# Patient Record
Sex: MALE | Race: BLACK OR AFRICAN AMERICAN | NOT HISPANIC OR LATINO | Employment: UNEMPLOYED | URBAN - METROPOLITAN AREA
[De-identification: names, ages, dates, MRNs, and addresses within clinical notes are randomized per-mention and may not be internally consistent; named-entity substitution may affect disease eponyms.]

---

## 2023-02-10 PROBLEM — T85.22XA DISLOCATED INTRAOCULAR LENS: Status: ACTIVE | Noted: 2023-02-10

## 2023-02-10 PROBLEM — I83.10 VARICOSE VEINS WITH INFLAMMATION: Status: ACTIVE | Noted: 2023-02-10

## 2023-02-10 PROBLEM — N18.6 ANEMIA DUE TO CHRONIC KIDNEY DISEASE, ON CHRONIC DIALYSIS (MULTI): Status: ACTIVE | Noted: 2023-02-10

## 2023-02-10 PROBLEM — D63.1 ANEMIA DUE TO CHRONIC KIDNEY DISEASE, ON CHRONIC DIALYSIS (MULTI): Status: ACTIVE | Noted: 2023-02-10

## 2023-02-10 PROBLEM — H52.209 ASTIGMATISM: Status: ACTIVE | Noted: 2023-02-10

## 2023-02-10 PROBLEM — Z96.1 PSEUDOPHAKIA OF LEFT EYE: Status: ACTIVE | Noted: 2023-02-10

## 2023-02-10 PROBLEM — M79.675 PAIN IN TOES OF BOTH FEET: Status: ACTIVE | Noted: 2023-02-10

## 2023-02-10 PROBLEM — B35.1 ONYCHOMYCOSIS: Status: ACTIVE | Noted: 2023-02-10

## 2023-02-10 PROBLEM — K42.9 UMBILICAL HERNIA: Status: ACTIVE | Noted: 2023-02-10

## 2023-02-10 PROBLEM — H35.351 CYSTOID MACULAR EDEMA OF RIGHT EYE: Status: ACTIVE | Noted: 2023-02-10

## 2023-02-10 PROBLEM — J45.909 ASTHMATIC BRONCHITIS (HHS-HCC): Status: ACTIVE | Noted: 2023-02-10

## 2023-02-10 PROBLEM — R73.03 PREDIABETES: Status: ACTIVE | Noted: 2023-02-10

## 2023-02-10 PROBLEM — M25.561 ARTHRALGIA OF BOTH KNEES: Status: ACTIVE | Noted: 2023-02-10

## 2023-02-10 PROBLEM — M54.50 LOW BACK PAIN: Status: ACTIVE | Noted: 2023-02-10

## 2023-02-10 PROBLEM — R91.1 PULMONARY NODULE SEEN ON IMAGING STUDY: Status: ACTIVE | Noted: 2023-02-10

## 2023-02-10 PROBLEM — G47.00 INSOMNIA: Status: ACTIVE | Noted: 2023-02-10

## 2023-02-10 PROBLEM — H52.00 HYPEROPIA WITH PRESBYOPIA: Status: ACTIVE | Noted: 2023-02-10

## 2023-02-10 PROBLEM — D36.9 TUBULAR ADENOMA: Status: ACTIVE | Noted: 2023-02-10

## 2023-02-10 PROBLEM — K57.32 DIVERTICULITIS OF COLON: Status: ACTIVE | Noted: 2023-02-10

## 2023-02-10 PROBLEM — Z96.1 PSEUDOPHAKIA OF RIGHT EYE: Status: ACTIVE | Noted: 2023-02-10

## 2023-02-10 PROBLEM — N62 GYNECOMASTIA: Status: ACTIVE | Noted: 2023-02-10

## 2023-02-10 PROBLEM — M17.0 PRIMARY OSTEOARTHRITIS OF BOTH KNEES: Status: ACTIVE | Noted: 2023-02-10

## 2023-02-10 PROBLEM — H52.4 HYPEROPIA WITH PRESBYOPIA: Status: ACTIVE | Noted: 2023-02-10

## 2023-02-10 PROBLEM — M25.559 HIP JOINT PAIN: Status: ACTIVE | Noted: 2023-02-10

## 2023-02-10 PROBLEM — F17.210 CIGARETTE SMOKER: Status: ACTIVE | Noted: 2023-02-10

## 2023-02-10 PROBLEM — E55.9 VITAMIN D DEFICIENCY: Status: ACTIVE | Noted: 2023-02-10

## 2023-02-10 PROBLEM — I10 BENIGN ESSENTIAL HTN: Status: ACTIVE | Noted: 2023-02-10

## 2023-02-10 PROBLEM — R63.5 WEIGHT GAIN: Status: ACTIVE | Noted: 2023-02-10

## 2023-02-10 PROBLEM — G62.9 PERIPHERAL NEUROPATHY: Status: ACTIVE | Noted: 2023-02-10

## 2023-02-10 PROBLEM — E11.9 DIABETES MELLITUS (MULTI): Status: ACTIVE | Noted: 2023-02-10

## 2023-02-10 PROBLEM — E66.01 MORBID OBESITY (MULTI): Status: ACTIVE | Noted: 2023-02-10

## 2023-02-10 PROBLEM — I48.91 ATRIAL FIBRILLATION (MULTI): Status: ACTIVE | Noted: 2023-02-10

## 2023-02-10 PROBLEM — Z99.2 ANEMIA DUE TO CHRONIC KIDNEY DISEASE, ON CHRONIC DIALYSIS (MULTI): Status: ACTIVE | Noted: 2023-02-10

## 2023-02-10 PROBLEM — M79.89 LEG SWELLING: Status: ACTIVE | Noted: 2023-02-10

## 2023-02-10 PROBLEM — M79.674 PAIN IN TOES OF BOTH FEET: Status: ACTIVE | Noted: 2023-02-10

## 2023-02-10 PROBLEM — M20.60 TOE DEFORMITY: Status: ACTIVE | Noted: 2023-02-10

## 2023-02-10 PROBLEM — H54.40 BLIND, UNILATERAL: Status: ACTIVE | Noted: 2023-02-10

## 2023-02-10 PROBLEM — E66.9 OBESITY, CLASS II, BMI 35-39.9: Status: ACTIVE | Noted: 2023-02-10

## 2023-02-10 PROBLEM — M79.672 LEFT FOOT PAIN: Status: ACTIVE | Noted: 2023-02-10

## 2023-02-10 PROBLEM — E66.812 CLASS 2 OBESITY WITH BODY MASS INDEX (BMI) OF 37.0 TO 37.9 IN ADULT: Status: ACTIVE | Noted: 2023-02-10

## 2023-02-10 PROBLEM — R10.9 ABDOMINAL DISCOMFORT: Status: ACTIVE | Noted: 2023-02-10

## 2023-02-10 PROBLEM — K59.00 CONSTIPATION: Status: ACTIVE | Noted: 2023-02-10

## 2023-02-10 PROBLEM — K43.9 VENTRAL HERNIA: Status: ACTIVE | Noted: 2023-02-10

## 2023-02-10 PROBLEM — F10.10 ALCOHOL ABUSE, CONTINUOUS: Status: ACTIVE | Noted: 2023-02-10

## 2023-02-10 PROBLEM — M25.562 ARTHRALGIA OF BOTH KNEES: Status: ACTIVE | Noted: 2023-02-10

## 2023-02-10 PROBLEM — J44.9 COPD, MODERATE (MULTI): Status: ACTIVE | Noted: 2023-02-10

## 2023-02-10 PROBLEM — E66.812 OBESITY, CLASS II, BMI 35-39.9: Status: ACTIVE | Noted: 2023-02-10

## 2023-02-10 PROBLEM — R60.0 EDEMA OF BOTH LEGS: Status: ACTIVE | Noted: 2023-02-10

## 2023-02-10 PROBLEM — I87.2 VENOUS STASIS DERMATITIS OF LOWER EXTREMITY: Status: ACTIVE | Noted: 2023-02-10

## 2023-02-10 PROBLEM — G47.33 OBSTRUCTIVE SLEEP APNEA OF ADULT: Status: ACTIVE | Noted: 2023-02-10

## 2023-02-10 PROBLEM — M25.569 KNEE PAIN: Status: ACTIVE | Noted: 2023-02-10

## 2023-02-10 PROBLEM — E66.9 CLASS 2 OBESITY WITH BODY MASS INDEX (BMI) OF 37.0 TO 37.9 IN ADULT: Status: ACTIVE | Noted: 2023-02-10

## 2023-02-10 PROBLEM — I83.10 STASIS ECZEMA: Status: ACTIVE | Noted: 2023-02-10

## 2023-02-10 RX ORDER — TRIAMTERENE/HYDROCHLOROTHIAZID 37.5-25 MG
1 TABLET ORAL DAILY
COMMUNITY
Start: 2022-03-02 | End: 2023-05-01 | Stop reason: SDUPTHER

## 2023-02-10 RX ORDER — ALBUTEROL SULFATE 90 UG/1
2 AEROSOL, METERED RESPIRATORY (INHALATION) EVERY 4 HOURS PRN
COMMUNITY
Start: 2017-04-10 | End: 2023-05-01 | Stop reason: SDUPTHER

## 2023-02-10 RX ORDER — AMMONIUM LACTATE 12 G/100G
LOTION TOPICAL 2 TIMES DAILY
COMMUNITY
Start: 2020-01-17 | End: 2023-03-17 | Stop reason: SDUPTHER

## 2023-02-10 RX ORDER — METFORMIN HYDROCHLORIDE EXTENDED-RELEASE TABLETS 500 MG/1
500 TABLET, FILM COATED, EXTENDED RELEASE ORAL
COMMUNITY
End: 2023-05-01 | Stop reason: SDUPTHER

## 2023-02-10 RX ORDER — LANCETS 33 GAUGE
EACH MISCELLANEOUS
COMMUNITY

## 2023-02-10 RX ORDER — ASPIRIN 81 MG/1
1 TABLET ORAL DAILY
COMMUNITY
Start: 2018-01-25

## 2023-02-10 RX ORDER — MELOXICAM 7.5 MG/1
1 TABLET ORAL DAILY
COMMUNITY
Start: 2020-12-02 | End: 2023-03-17 | Stop reason: SDUPTHER

## 2023-02-10 RX ORDER — CYCLOPENTOLATE HYDROCHLORIDE 10 MG/ML
1 SOLUTION/ DROPS OPHTHALMIC 2 TIMES DAILY
COMMUNITY
Start: 2020-06-09

## 2023-02-10 RX ORDER — TRIAMCINOLONE ACETONIDE 0.25 MG/G
OINTMENT TOPICAL
COMMUNITY
Start: 2019-03-21 | End: 2023-03-17 | Stop reason: SDUPTHER

## 2023-02-10 RX ORDER — BUDESONIDE 0.5 MG/2ML
0.5 INHALANT ORAL 2 TIMES DAILY
COMMUNITY
Start: 2020-08-04 | End: 2023-05-01 | Stop reason: SDUPTHER

## 2023-02-10 RX ORDER — TRIAMCINOLONE ACETONIDE 1 MG/ML
LOTION TOPICAL
COMMUNITY
Start: 2021-05-19

## 2023-02-10 RX ORDER — ACETAMINOPHEN 325 MG/1
650 TABLET ORAL EVERY 6 HOURS
COMMUNITY
Start: 2021-02-22

## 2023-02-10 RX ORDER — BLOOD SUGAR DIAGNOSTIC
STRIP MISCELLANEOUS
COMMUNITY
Start: 2021-05-25

## 2023-02-10 RX ORDER — AZELASTINE HYDROCHLORIDE 0.5 MG/ML
1 SOLUTION/ DROPS OPHTHALMIC 2 TIMES DAILY PRN
COMMUNITY
Start: 2021-09-28

## 2023-03-17 DIAGNOSIS — M25.569 ARTHRALGIA OF KNEE, UNSPECIFIED LATERALITY: Primary | ICD-10-CM

## 2023-03-17 DIAGNOSIS — R21 RASH: ICD-10-CM

## 2023-03-17 RX ORDER — TRIAMCINOLONE ACETONIDE 0.25 MG/G
OINTMENT TOPICAL 2 TIMES DAILY
Qty: 400 G | Refills: 1 | Status: SHIPPED | OUTPATIENT
Start: 2023-03-17 | End: 2024-05-21 | Stop reason: SDUPTHER

## 2023-03-17 RX ORDER — MELOXICAM 7.5 MG/1
7.5 TABLET ORAL DAILY
Qty: 90 TABLET | Refills: 1 | Status: SHIPPED | OUTPATIENT
Start: 2023-03-17 | End: 2023-03-24 | Stop reason: SDUPTHER

## 2023-03-17 RX ORDER — AMMONIUM LACTATE 12 G/100G
LOTION TOPICAL AS NEEDED
Qty: 400 G | Refills: 1 | Status: SHIPPED | OUTPATIENT
Start: 2023-03-17 | End: 2023-03-24 | Stop reason: SDUPTHER

## 2023-03-20 DIAGNOSIS — R21 RASH: Primary | ICD-10-CM

## 2023-03-20 DIAGNOSIS — M25.569 ARTHRALGIA OF KNEE, UNSPECIFIED LATERALITY: ICD-10-CM

## 2023-03-21 RX ORDER — TRIAMCINOLONE ACETONIDE 1 MG/G
CREAM TOPICAL 2 TIMES DAILY
Qty: 15 G | Refills: 0 | Status: SHIPPED | OUTPATIENT
Start: 2023-03-21

## 2023-03-21 RX ORDER — AMMONIUM LACTATE 12 G/100G
LOTION TOPICAL ONCE
Status: SHIPPED | OUTPATIENT
Start: 2023-03-21

## 2023-03-21 RX ORDER — MELOXICAM 7.5 MG/1
7.5 TABLET ORAL ONCE
Status: SHIPPED | OUTPATIENT
Start: 2023-03-21

## 2023-03-23 DIAGNOSIS — M25.569 ARTHRALGIA OF KNEE, UNSPECIFIED LATERALITY: ICD-10-CM

## 2023-03-23 DIAGNOSIS — R21 RASH: ICD-10-CM

## 2023-03-24 RX ORDER — AMMONIUM LACTATE 12 G/100G
LOTION TOPICAL AS NEEDED
Qty: 400 G | Refills: 1 | Status: SHIPPED | OUTPATIENT
Start: 2023-03-24 | End: 2023-05-01 | Stop reason: SDUPTHER

## 2023-03-24 RX ORDER — MELOXICAM 7.5 MG/1
7.5 TABLET ORAL DAILY
Qty: 90 TABLET | Refills: 1 | Status: SHIPPED | OUTPATIENT
Start: 2023-03-24 | End: 2024-05-21 | Stop reason: SDUPTHER

## 2023-05-01 ENCOUNTER — OFFICE VISIT (OUTPATIENT)
Dept: PRIMARY CARE | Facility: CLINIC | Age: 68
End: 2023-05-01
Payer: MEDICARE

## 2023-05-01 VITALS
BODY MASS INDEX: 40.62 KG/M2 | TEMPERATURE: 97.4 F | HEIGHT: 68 IN | WEIGHT: 268 LBS | SYSTOLIC BLOOD PRESSURE: 120 MMHG | HEART RATE: 59 BPM | DIASTOLIC BLOOD PRESSURE: 69 MMHG

## 2023-05-01 DIAGNOSIS — R21 RASH: ICD-10-CM

## 2023-05-01 DIAGNOSIS — Z00.00 HEALTHCARE MAINTENANCE: ICD-10-CM

## 2023-05-01 DIAGNOSIS — E55.9 VITAMIN D DEFICIENCY: ICD-10-CM

## 2023-05-01 DIAGNOSIS — E11.9 TYPE 2 DIABETES MELLITUS WITHOUT COMPLICATION, WITHOUT LONG-TERM CURRENT USE OF INSULIN (MULTI): ICD-10-CM

## 2023-05-01 DIAGNOSIS — J42 CHRONIC BRONCHITIS, UNSPECIFIED CHRONIC BRONCHITIS TYPE (MULTI): Primary | ICD-10-CM

## 2023-05-01 DIAGNOSIS — J42 CHRONIC BRONCHITIS, UNSPECIFIED CHRONIC BRONCHITIS TYPE (MULTI): ICD-10-CM

## 2023-05-01 DIAGNOSIS — I10 HYPERTENSION, UNSPECIFIED TYPE: ICD-10-CM

## 2023-05-01 DIAGNOSIS — E11.9 TYPE 2 DIABETES MELLITUS WITHOUT COMPLICATION, WITHOUT LONG-TERM CURRENT USE OF INSULIN (MULTI): Primary | ICD-10-CM

## 2023-05-01 PROCEDURE — G0438 PPPS, INITIAL VISIT: HCPCS | Performed by: FAMILY MEDICINE

## 2023-05-01 PROCEDURE — 3078F DIAST BP <80 MM HG: CPT | Performed by: FAMILY MEDICINE

## 2023-05-01 PROCEDURE — 1159F MED LIST DOCD IN RCRD: CPT | Performed by: FAMILY MEDICINE

## 2023-05-01 PROCEDURE — 3074F SYST BP LT 130 MM HG: CPT | Performed by: FAMILY MEDICINE

## 2023-05-01 RX ORDER — TRIAMTERENE/HYDROCHLOROTHIAZID 37.5-25 MG
1 TABLET ORAL DAILY
Qty: 90 TABLET | Refills: 1 | Status: SHIPPED | OUTPATIENT
Start: 2023-05-01 | End: 2024-05-21 | Stop reason: SDUPTHER

## 2023-05-01 RX ORDER — METFORMIN HYDROCHLORIDE EXTENDED-RELEASE TABLETS 500 MG/1
500 TABLET, FILM COATED, EXTENDED RELEASE ORAL
Qty: 90 TABLET | Refills: 3 | Status: SHIPPED | OUTPATIENT
Start: 2023-05-01 | End: 2023-05-03 | Stop reason: ENTERED-IN-ERROR

## 2023-05-01 RX ORDER — ALBUTEROL SULFATE 5 MG/ML
SOLUTION RESPIRATORY (INHALATION)
Qty: 1 ML | Refills: 3 | Status: SHIPPED | OUTPATIENT
Start: 2023-05-01 | End: 2024-04-30

## 2023-05-01 RX ORDER — ALBUTEROL SULFATE 90 UG/1
2 AEROSOL, METERED RESPIRATORY (INHALATION) EVERY 4 HOURS PRN
Qty: 18 G | Refills: 11 | Status: SHIPPED | OUTPATIENT
Start: 2023-05-01 | End: 2024-05-21 | Stop reason: SDUPTHER

## 2023-05-01 RX ORDER — METFORMIN HYDROCHLORIDE EXTENDED-RELEASE TABLETS 500 MG/1
500 TABLET, FILM COATED, EXTENDED RELEASE ORAL
Qty: 90 TABLET | Refills: 1 | Status: SHIPPED | OUTPATIENT
Start: 2023-05-01 | End: 2023-11-28

## 2023-05-01 RX ORDER — BUDESONIDE 0.5 MG/2ML
0.5 INHALANT ORAL
Qty: 60 ML | Refills: 3 | Status: SHIPPED | OUTPATIENT
Start: 2023-05-01 | End: 2023-11-28 | Stop reason: WASHOUT

## 2023-05-01 RX ORDER — AMMONIUM LACTATE 12 G/100G
LOTION TOPICAL AS NEEDED
Qty: 400 G | Refills: 1 | Status: SHIPPED | OUTPATIENT
Start: 2023-05-01 | End: 2023-12-05 | Stop reason: SDUPTHER

## 2023-05-01 ASSESSMENT — ENCOUNTER SYMPTOMS
LOSS OF SENSATION IN FEET: 0
OCCASIONAL FEELINGS OF UNSTEADINESS: 0
DEPRESSION: 0

## 2023-05-01 NOTE — PROGRESS NOTES
"Subjective   Patient ID: Alysia Hart is a 67 y.o. male who presents for [facility].    HPI     Review of Systems    Objective   /69   Pulse 59   Temp 36.3 °C (97.4 °F)   Ht 1.727 m (5' 8\")   Wt 122 kg (268 lb)   BMI 40.75 kg/m²     Physical Exam  HENT:      Head: Normocephalic.      Right Ear: Tympanic membrane normal.   Cardiovascular:      Rate and Rhythm: Normal rate.   Pulmonary:      Effort: Pulmonary effort is normal.      Breath sounds: Normal breath sounds.   Musculoskeletal:         General: Normal range of motion.      Cervical back: Normal range of motion.   Skin:     General: Skin is warm.   Neurological:      General: No focal deficit present.      Mental Status: He is alert.         Assessment/Plan     This is a 67-year-old male patient here for his Medicare well visit    He has not been wearing his CPAP machine I indicated that it is very important for him to wear the CPAP machine    I also reeducated about the COPD    Renewed his prescription for Pulmicort and albuterol for the nebulizer and also albuterol inhaler    He has a protuberant abdomen and ventral hernia advised him to continue to walk and exercise    Also suggested Silver sneakers    He has stasis eczema encouraged him to wear the support stocking and also use Lac-Hydrin    Routine labs will be drawn    Colonoscopy due next year    Advised him to come back in 6 months    He has stopped smoking several         "

## 2023-05-01 NOTE — PATIENT INSTRUCTIONS
Please wear your CPAP machine    Sleep apnea can cause death in your sleep or heart attack or a stroke    You have to be on the maintenance inhaler every day so that you will not collect mucus in your lung    For the COPD I have ordered the medicine to put in your aerosol machine that is the Pulmicort and albuterol 1 unit in the morning and lasting at night and in between you can use your albuterol handheld puffer 2 puffs for shortness of breath okay        Colonoscopy due next year      CAT scan of your lung was good      1.  Every day sleep  at night or rest ( some days we are unable to sleep )around the same time without the TV-this is important habit to reduce dementia and aging prematurely    2.  Eat 3 cups of green leafy vegetables daily include at least 1 fruit.,  Reduce animal protein consumption-/ also  Starch  consumption  --this will help to lose weight avoid illnesses such as dementia high blood pressure cancer.,  Diabetes    3.  Exercise including aerobics as well as resistant exercise for at least 45 minutes a day for 5 days this will also help to reduce premature aging       Please asked [facility] to send us the information of your COVID vaccines and your shot for shingles      Look into Silver sneakers program on your Medicare insurance plan it is a free exercise program

## 2023-05-01 NOTE — PROGRESS NOTES
Subjective   Reason for Visit: Alysia Hart is an 67 y.o. male here for a Medicare Wellness visit.               HPI    Patient Care Team:  Nuris Long MD as PCP - General  Nuris Long MD as PCP - [employer] PCP     Review of Systems    Objective   Vitals:  There were no vitals taken for this visit.      Physical Exam    Assessment/Plan   Problem List Items Addressed This Visit    None

## 2023-05-03 RX ORDER — METFORMIN HYDROCHLORIDE 500 MG/1
500 TABLET ORAL
Qty: 60 TABLET | Refills: 11 | Status: SHIPPED | OUTPATIENT
Start: 2023-05-03 | End: 2023-11-28

## 2023-08-22 PROBLEM — H26.9 CATARACT: Status: ACTIVE | Noted: 2023-08-22

## 2023-08-22 PROBLEM — H26.491 PCO (POSTERIOR CAPSULAR OPACIFICATION), RIGHT: Status: ACTIVE | Noted: 2023-08-22

## 2023-08-22 PROBLEM — H25.12 CATARACT, NUCLEAR SCLEROTIC, LEFT EYE: Status: ACTIVE | Noted: 2023-08-22

## 2023-08-22 PROBLEM — R10.9 ABDOMINAL PAIN: Status: ACTIVE | Noted: 2023-08-22

## 2023-08-22 PROBLEM — H25.812 COMBINED FORM OF AGE-RELATED CATARACT, LEFT EYE: Status: ACTIVE | Noted: 2023-08-22

## 2023-08-22 PROBLEM — G60.3 IDIOPATHIC PROGRESSIVE POLYNEUROPATHY: Status: ACTIVE | Noted: 2020-10-12

## 2023-08-22 PROBLEM — R20.2 PARESTHESIA OF FOOT: Status: ACTIVE | Noted: 2023-08-22

## 2023-08-22 PROBLEM — D64.9 ANEMIA: Status: ACTIVE | Noted: 2023-08-22

## 2023-08-22 PROBLEM — F32.A ANXIETY AND DEPRESSION: Status: ACTIVE | Noted: 2023-08-22

## 2023-08-22 PROBLEM — R06.02 SOB (SHORTNESS OF BREATH) ON EXERTION: Status: ACTIVE | Noted: 2023-08-22

## 2023-08-22 PROBLEM — T14.8XXA WOUND, OPEN: Status: ACTIVE | Noted: 2023-08-22

## 2023-08-22 PROBLEM — H53.8 BLURRED VISION: Status: ACTIVE | Noted: 2023-08-22

## 2023-08-22 PROBLEM — H04.123 BILATERAL DRY EYES: Status: ACTIVE | Noted: 2023-08-22

## 2023-08-22 PROBLEM — I87.2 VENOUS INSUFFICIENCY: Status: ACTIVE | Noted: 2023-08-22

## 2023-08-22 PROBLEM — L97.219: Status: ACTIVE | Noted: 2023-08-22

## 2023-08-22 PROBLEM — F41.9 ANXIETY AND DEPRESSION: Status: ACTIVE | Noted: 2023-08-22

## 2023-08-22 PROBLEM — H25.89 CATARACT MATURE, TOTAL SENILE: Status: ACTIVE | Noted: 2023-08-22

## 2023-08-22 RX ORDER — CHOLECALCIFEROL (VITAMIN D3) 25 MCG
1 TABLET ORAL DAILY
COMMUNITY

## 2023-08-22 RX ORDER — IPRATROPIUM BROMIDE AND ALBUTEROL SULFATE 2.5; .5 MG/3ML; MG/3ML
3 SOLUTION RESPIRATORY (INHALATION) EVERY 4 HOURS
COMMUNITY
Start: 2020-06-05 | End: 2023-11-28 | Stop reason: WASHOUT

## 2023-08-22 RX ORDER — OMEPRAZOLE 20 MG/1
20 CAPSULE, DELAYED RELEASE ORAL
COMMUNITY
Start: 2017-06-15 | End: 2023-11-28 | Stop reason: WASHOUT

## 2023-08-22 RX ORDER — MIRTAZAPINE 15 MG/1
15 TABLET, FILM COATED ORAL NIGHTLY
COMMUNITY
Start: 2022-08-03 | End: 2023-11-28 | Stop reason: WASHOUT

## 2023-08-22 RX ORDER — HYDROGEN PEROXIDE 3 %
20 SOLUTION, NON-ORAL MISCELLANEOUS
COMMUNITY
Start: 2010-06-21 | End: 2023-11-28 | Stop reason: WASHOUT

## 2023-08-22 RX ORDER — MOMETASONE FUROATE 220 UG/1
2 INHALANT RESPIRATORY (INHALATION) DAILY
COMMUNITY
Start: 2017-06-13 | End: 2023-11-28 | Stop reason: WASHOUT

## 2023-08-22 RX ORDER — MONTELUKAST SODIUM 10 MG/1
10 TABLET ORAL NIGHTLY
COMMUNITY
Start: 2020-06-05 | End: 2023-11-28 | Stop reason: WASHOUT

## 2023-08-22 RX ORDER — NAPROXEN 500 MG/1
500 TABLET ORAL 2 TIMES DAILY
COMMUNITY
Start: 2015-11-09 | End: 2023-11-28 | Stop reason: WASHOUT

## 2023-08-22 RX ORDER — CLOTRIMAZOLE 1 %
CREAM (GRAM) TOPICAL 2 TIMES DAILY
COMMUNITY
Start: 2012-04-05

## 2023-08-22 RX ORDER — ACETAMINOPHEN 500 MG
2000 TABLET ORAL
COMMUNITY
Start: 2012-04-05

## 2023-08-22 RX ORDER — BUPROPION HYDROCHLORIDE 150 MG/1
TABLET, EXTENDED RELEASE ORAL
COMMUNITY
Start: 2015-12-11 | End: 2023-11-28 | Stop reason: WASHOUT

## 2023-08-22 RX ORDER — BUDESONIDE AND FORMOTEROL FUMARATE DIHYDRATE 160; 4.5 UG/1; UG/1
2 AEROSOL RESPIRATORY (INHALATION) 2 TIMES DAILY PRN
COMMUNITY
End: 2023-11-28 | Stop reason: WASHOUT

## 2023-08-22 RX ORDER — ERGOCALCIFEROL 1.25 MG/1
50000 CAPSULE ORAL
COMMUNITY
Start: 2015-11-15

## 2023-08-22 RX ORDER — PANTOPRAZOLE SODIUM 20 MG/1
20 TABLET, DELAYED RELEASE ORAL DAILY
COMMUNITY
Start: 2018-01-26 | End: 2023-11-28 | Stop reason: WASHOUT

## 2023-08-22 RX ORDER — CELECOXIB 200 MG/1
1 CAPSULE ORAL 2 TIMES DAILY
COMMUNITY
Start: 2022-04-18 | End: 2023-11-28 | Stop reason: WASHOUT

## 2023-10-03 ENCOUNTER — APPOINTMENT (OUTPATIENT)
Dept: SLEEP MEDICINE | Facility: HOSPITAL | Age: 68
End: 2023-10-03
Payer: MEDICARE

## 2023-11-01 ENCOUNTER — APPOINTMENT (OUTPATIENT)
Dept: PRIMARY CARE | Facility: CLINIC | Age: 68
End: 2023-11-01
Payer: MEDICARE

## 2023-11-28 ENCOUNTER — OFFICE VISIT (OUTPATIENT)
Dept: PRIMARY CARE | Facility: CLINIC | Age: 68
End: 2023-11-28
Payer: MEDICARE

## 2023-11-28 VITALS
HEART RATE: 63 BPM | SYSTOLIC BLOOD PRESSURE: 128 MMHG | BODY MASS INDEX: 39.56 KG/M2 | DIASTOLIC BLOOD PRESSURE: 72 MMHG | TEMPERATURE: 97.5 F | HEIGHT: 68 IN | OXYGEN SATURATION: 96 % | WEIGHT: 261 LBS

## 2023-11-28 DIAGNOSIS — I10 BENIGN ESSENTIAL HTN: ICD-10-CM

## 2023-11-28 DIAGNOSIS — E11.9 TYPE 2 DIABETES MELLITUS WITHOUT COMPLICATION, WITHOUT LONG-TERM CURRENT USE OF INSULIN (MULTI): ICD-10-CM

## 2023-11-28 DIAGNOSIS — N52.9 IMPOTENCE: Primary | ICD-10-CM

## 2023-11-28 PROCEDURE — 1159F MED LIST DOCD IN RCRD: CPT | Performed by: FAMILY MEDICINE

## 2023-11-28 PROCEDURE — 99213 OFFICE O/P EST LOW 20 MIN: CPT | Performed by: FAMILY MEDICINE

## 2023-11-28 PROCEDURE — 3078F DIAST BP <80 MM HG: CPT | Performed by: FAMILY MEDICINE

## 2023-11-28 PROCEDURE — 1126F AMNT PAIN NOTED NONE PRSNT: CPT | Performed by: FAMILY MEDICINE

## 2023-11-28 PROCEDURE — 3074F SYST BP LT 130 MM HG: CPT | Performed by: FAMILY MEDICINE

## 2023-11-28 PROCEDURE — 1160F RVW MEDS BY RX/DR IN RCRD: CPT | Performed by: FAMILY MEDICINE

## 2023-11-28 RX ORDER — METFORMIN HYDROCHLORIDE 500 MG/1
1000 TABLET ORAL
Qty: 120 TABLET | Refills: 11 | Status: SHIPPED | OUTPATIENT
Start: 2023-11-28 | End: 2024-05-21 | Stop reason: SDUPTHER

## 2023-11-28 RX ORDER — SILDENAFIL 100 MG/1
100 TABLET, FILM COATED ORAL DAILY PRN
Qty: 12 TABLET | Refills: 3 | Status: SHIPPED | OUTPATIENT
Start: 2023-11-28 | End: 2024-11-27

## 2023-11-28 NOTE — PROGRESS NOTES
This is a 68-year-old male patient who is here for follow-up    He had a request Viagra    Regarding sleep apnea she has given away the machine I advised him to call the company to get back the machine he has moderate sleep apnea    He is taking the triamterene    He says when he is taking metformin the swelling of the legs are less he wanted me to increase the metformin which I did 1000 twice a day    Says he is feeling much better not smoking feels more energetic he is feeling more energetic after he started taking the metformin    I advised patient to come back in May for his annual physical

## 2023-12-05 DIAGNOSIS — R21 RASH: ICD-10-CM

## 2023-12-05 RX ORDER — AMMONIUM LACTATE 12 G/100G
LOTION TOPICAL AS NEEDED
Qty: 400 G | Refills: 1 | Status: SHIPPED | OUTPATIENT
Start: 2023-12-05 | End: 2024-04-08 | Stop reason: SDUPTHER

## 2023-12-10 ASSESSMENT — SLIT LAMP EXAM - LIDS
COMMENTS: GOOD POSITION
COMMENTS: GOOD POSITION

## 2023-12-10 ASSESSMENT — EXTERNAL EXAM - LEFT EYE: OS_EXAM: NORMAL

## 2023-12-10 ASSESSMENT — EXTERNAL EXAM - RIGHT EYE: OD_EXAM: NORMAL

## 2023-12-11 ENCOUNTER — OFFICE VISIT (OUTPATIENT)
Dept: OPHTHALMOLOGY | Facility: CLINIC | Age: 68
End: 2023-12-11
Payer: MEDICARE

## 2023-12-11 DIAGNOSIS — Z83.511 FAMILY HISTORY OF GLAUCOMA: ICD-10-CM

## 2023-12-11 DIAGNOSIS — Z96.1 PSEUDOPHAKIA: ICD-10-CM

## 2023-12-11 DIAGNOSIS — H52.03 HYPEROPIA, BILATERAL: ICD-10-CM

## 2023-12-11 DIAGNOSIS — T85.22XS DISLOCATION OF INTRAOCULAR LENS, SEQUELA: ICD-10-CM

## 2023-12-11 DIAGNOSIS — H40.003 GLAUCOMA SUSPECT OF BOTH EYES: ICD-10-CM

## 2023-12-11 DIAGNOSIS — H52.4 PRESBYOPIA: ICD-10-CM

## 2023-12-11 DIAGNOSIS — H26.491 PCO (POSTERIOR CAPSULAR OPACIFICATION), RIGHT: ICD-10-CM

## 2023-12-11 DIAGNOSIS — R73.03 PREDIABETES: Primary | ICD-10-CM

## 2023-12-11 DIAGNOSIS — H10.13 ALLERGIC CONJUNCTIVITIS OF BOTH EYES: ICD-10-CM

## 2023-12-11 PROBLEM — H53.40 UNSPECIFIED VISUAL FIELD DEFECTS: Status: ACTIVE | Noted: 2023-12-11

## 2023-12-11 PROCEDURE — 92014 COMPRE OPH EXAM EST PT 1/>: CPT | Performed by: OPHTHALMOLOGY

## 2023-12-11 PROCEDURE — 92133 CPTRZD OPH DX IMG PST SGM ON: CPT | Performed by: OPHTHALMOLOGY

## 2023-12-11 PROCEDURE — 92015 DETERMINE REFRACTIVE STATE: CPT | Performed by: OPHTHALMOLOGY

## 2023-12-11 ASSESSMENT — VISUAL ACUITY
OD_SC: 20/30
OS_SC: 20/20
METHOD: SNELLEN - LINEAR

## 2023-12-11 ASSESSMENT — REFRACTION_MANIFEST
OD_SPHERE: +2.00
OS_SPHERE: +0.75
OS_ADD: +2.50
OD_ADD: +2.50

## 2023-12-11 ASSESSMENT — TONOMETRY
OS_IOP_MMHG: 12
IOP_METHOD: GOLDMANN APPLANATION
OD_IOP_MMHG: 10

## 2023-12-11 ASSESSMENT — CUP TO DISC RATIO
OD_RATIO: .45
OS_RATIO: .45

## 2023-12-11 ASSESSMENT — ENCOUNTER SYMPTOMS: EYES NEGATIVE: 1

## 2023-12-11 NOTE — PATIENT INSTRUCTIONS
Anupama Young MD               [phone]    Patient name: Alysia Hart    Date of visit: December 11, 2023      Warm compresses: 1-2 times a day    Artificial tears (Refresh, Systane, Theratears, Genteal, Blink): 4 times a day

## 2023-12-11 NOTE — PROGRESS NOTES
Pre-aowvldnyI85.03  -HbA1c= 6.2 (12/14/22). No diabetic retinopathy seen on exam. Continue close monitoring of blood glucose, blood pressure, and cholesterol. Plan for annual dilated eye exam with OCT RNFL.    Dislocated intraocular lensT85.22XA  Blurred xflnzyA98.8  Pseudophakia of right eyeZ96.1 - 3/7/19 - Complex with Trypan Blue and CTR placement for zonular dehiscence/weakness.   PCO (posterior capsular opacification), nnquaE43.491  -Complex CE IOL due to likely traumatic cataract OD (3/7/2020 - [place]) with CTR in place for >3 clock hours of zonular dehiscence.  -OCT macula (12/11/23) - SS: 9/10 OD and 5/10 OS. Normal thickness and contour OU. Intact IS-OS OU. No edema OU. 294/280. Stable from 12/19/22.   -Pt is s/p Yamane IOL OD 7/7/20 ([place]), had pseudophakic CME, now resolved. Will refer back to Dr. Camara as needed.     Glaucoma suspect, both eyes  Family history of glaucoma - aunts and uncles, possibly brother  -OCT RNFL (12/11/23) - SS: 8/10 OU. OD: Maria G[facility] ST. OS: Gerald T. 91/83. Baseline OCT RNFL.   -Low suspicion for glaucoma at this time. No treatment recommended. F/u 1 year for comprehensive exam with OCT RNFL.     Pseudophakia of left eyeZ96.1 - 10/28/21 - Complex with trypan blue and malyugin ring  -Doing well. Good vision. IOP good. Off all gtts.    Allergic conjunctivitis of both eyesH10.13  -History of occasional red/itchy eyes - does not itch anymore  -FBS OS intermittently   -May use azelastine OU BID - (never used), or artificial tears PRN    XmmuowjdlT24.00  Presbyopia  -New Rx given per patient request.       No FH of AMD

## 2024-01-23 DIAGNOSIS — F17.210 SMOKING GREATER THAN 20 PACK YEARS: ICD-10-CM

## 2024-01-23 DIAGNOSIS — F17.200 SMOKER: Primary | ICD-10-CM

## 2024-02-01 ENCOUNTER — HOSPITAL ENCOUNTER (OUTPATIENT)
Dept: RADIOLOGY | Facility: HOSPITAL | Age: 69
Discharge: HOME | End: 2024-02-01
Payer: MEDICARE

## 2024-02-01 DIAGNOSIS — F17.210 SMOKING GREATER THAN 20 PACK YEARS: ICD-10-CM

## 2024-02-01 DIAGNOSIS — F17.200 SMOKER: ICD-10-CM

## 2024-02-01 PROCEDURE — 71271 CT THORAX LUNG CANCER SCR C-: CPT

## 2024-02-01 PROCEDURE — 71271 CT THORAX LUNG CANCER SCR C-: CPT | Performed by: RADIOLOGY

## 2024-04-08 DIAGNOSIS — R21 RASH: ICD-10-CM

## 2024-04-08 RX ORDER — AMMONIUM LACTATE 12 G/100G
LOTION TOPICAL AS NEEDED
Qty: 400 G | Refills: 1 | Status: SHIPPED | OUTPATIENT
Start: 2024-04-08 | End: 2024-05-21 | Stop reason: SDUPTHER

## 2024-05-21 ENCOUNTER — OFFICE VISIT (OUTPATIENT)
Dept: PRIMARY CARE | Facility: CLINIC | Age: 69
End: 2024-05-21
Payer: MEDICARE

## 2024-05-21 VITALS
WEIGHT: 264.8 LBS | BODY MASS INDEX: 40.13 KG/M2 | SYSTOLIC BLOOD PRESSURE: 124 MMHG | HEIGHT: 68 IN | TEMPERATURE: 97.7 F | DIASTOLIC BLOOD PRESSURE: 77 MMHG | HEART RATE: 59 BPM

## 2024-05-21 DIAGNOSIS — E55.9 VITAMIN D DEFICIENCY: ICD-10-CM

## 2024-05-21 DIAGNOSIS — Z00.00 ROUTINE GENERAL MEDICAL EXAMINATION AT A HEALTH CARE FACILITY: ICD-10-CM

## 2024-05-21 DIAGNOSIS — R21 RASH: ICD-10-CM

## 2024-05-21 DIAGNOSIS — R53.83 OTHER FATIGUE: ICD-10-CM

## 2024-05-21 DIAGNOSIS — E11.9 TYPE 2 DIABETES MELLITUS WITHOUT COMPLICATION, WITHOUT LONG-TERM CURRENT USE OF INSULIN (MULTI): ICD-10-CM

## 2024-05-21 DIAGNOSIS — I10 BENIGN ESSENTIAL HTN: Primary | ICD-10-CM

## 2024-05-21 DIAGNOSIS — D36.9 TUBULAR ADENOMA: ICD-10-CM

## 2024-05-21 DIAGNOSIS — M25.569 ARTHRALGIA OF KNEE, UNSPECIFIED LATERALITY: ICD-10-CM

## 2024-05-21 DIAGNOSIS — G47.33 OBSTRUCTIVE SLEEP APNEA OF ADULT: ICD-10-CM

## 2024-05-21 DIAGNOSIS — I10 HYPERTENSION, UNSPECIFIED TYPE: ICD-10-CM

## 2024-05-21 DIAGNOSIS — J42 CHRONIC BRONCHITIS, UNSPECIFIED CHRONIC BRONCHITIS TYPE (MULTI): ICD-10-CM

## 2024-05-21 LAB
25(OH)D3 SERPL-MCNC: 31 NG/ML (ref 30–100)
ALBUMIN SERPL BCP-MCNC: 4.4 G/DL (ref 3.4–5)
ALP SERPL-CCNC: 67 U/L (ref 33–136)
ALT SERPL W P-5'-P-CCNC: 16 U/L (ref 10–52)
ANION GAP SERPL CALC-SCNC: 15 MMOL/L (ref 10–20)
AST SERPL W P-5'-P-CCNC: 20 U/L (ref 9–39)
BASOPHILS # BLD AUTO: 0.03 X10*3/UL (ref 0–0.1)
BASOPHILS NFR BLD AUTO: 0.6 %
BILIRUB SERPL-MCNC: 0.5 MG/DL (ref 0–1.2)
BUN SERPL-MCNC: 12 MG/DL (ref 6–23)
CALCIUM SERPL-MCNC: 9.2 MG/DL (ref 8.6–10.6)
CHLORIDE SERPL-SCNC: 102 MMOL/L (ref 98–107)
CHOLEST SERPL-MCNC: 139 MG/DL (ref 0–199)
CHOLESTEROL/HDL RATIO: 2.6
CO2 SERPL-SCNC: 26 MMOL/L (ref 21–32)
CREAT SERPL-MCNC: 0.83 MG/DL (ref 0.5–1.3)
EGFRCR SERPLBLD CKD-EPI 2021: >90 ML/MIN/1.73M*2
EOSINOPHIL # BLD AUTO: 0.15 X10*3/UL (ref 0–0.7)
EOSINOPHIL NFR BLD AUTO: 2.9 %
ERYTHROCYTE [DISTWIDTH] IN BLOOD BY AUTOMATED COUNT: 14.1 % (ref 11.5–14.5)
GLUCOSE SERPL-MCNC: 84 MG/DL (ref 74–99)
HCT VFR BLD AUTO: 41.6 % (ref 41–52)
HDLC SERPL-MCNC: 54.1 MG/DL
HGB BLD-MCNC: 14.2 G/DL (ref 13.5–17.5)
IMM GRANULOCYTES # BLD AUTO: 0.02 X10*3/UL (ref 0–0.7)
IMM GRANULOCYTES NFR BLD AUTO: 0.4 % (ref 0–0.9)
LDLC SERPL CALC-MCNC: 65 MG/DL
LYMPHOCYTES # BLD AUTO: 2.3 X10*3/UL (ref 1.2–4.8)
LYMPHOCYTES NFR BLD AUTO: 44 %
MCH RBC QN AUTO: 29.6 PG (ref 26–34)
MCHC RBC AUTO-ENTMCNC: 34.1 G/DL (ref 32–36)
MCV RBC AUTO: 87 FL (ref 80–100)
MONOCYTES # BLD AUTO: 0.67 X10*3/UL (ref 0.1–1)
MONOCYTES NFR BLD AUTO: 12.8 %
NEUTROPHILS # BLD AUTO: 2.06 X10*3/UL (ref 1.2–7.7)
NEUTROPHILS NFR BLD AUTO: 39.3 %
NON HDL CHOLESTEROL: 85 MG/DL (ref 0–149)
NRBC BLD-RTO: 0 /100 WBCS (ref 0–0)
PLATELET # BLD AUTO: 287 X10*3/UL (ref 150–450)
POTASSIUM SERPL-SCNC: 4.4 MMOL/L (ref 3.5–5.3)
PROT SERPL-MCNC: 7.4 G/DL (ref 6.4–8.2)
PSA SERPL-MCNC: 0.28 NG/ML
RBC # BLD AUTO: 4.8 X10*6/UL (ref 4.5–5.9)
SODIUM SERPL-SCNC: 139 MMOL/L (ref 136–145)
TRIGL SERPL-MCNC: 99 MG/DL (ref 0–149)
TSH SERPL-ACNC: 2.33 MIU/L (ref 0.44–3.98)
VLDL: 20 MG/DL (ref 0–40)
WBC # BLD AUTO: 5.2 X10*3/UL (ref 4.4–11.3)

## 2024-05-21 PROCEDURE — 1036F TOBACCO NON-USER: CPT | Performed by: FAMILY MEDICINE

## 2024-05-21 PROCEDURE — 85025 COMPLETE CBC W/AUTO DIFF WBC: CPT

## 2024-05-21 PROCEDURE — 84153 ASSAY OF PSA TOTAL: CPT

## 2024-05-21 PROCEDURE — 99213 OFFICE O/P EST LOW 20 MIN: CPT | Performed by: FAMILY MEDICINE

## 2024-05-21 PROCEDURE — 84443 ASSAY THYROID STIM HORMONE: CPT

## 2024-05-21 PROCEDURE — 82306 VITAMIN D 25 HYDROXY: CPT

## 2024-05-21 PROCEDURE — 3078F DIAST BP <80 MM HG: CPT | Performed by: FAMILY MEDICINE

## 2024-05-21 PROCEDURE — 3074F SYST BP LT 130 MM HG: CPT | Performed by: FAMILY MEDICINE

## 2024-05-21 PROCEDURE — 80061 LIPID PANEL: CPT

## 2024-05-21 PROCEDURE — 80053 COMPREHEN METABOLIC PANEL: CPT

## 2024-05-21 PROCEDURE — 36415 COLL VENOUS BLD VENIPUNCTURE: CPT

## 2024-05-21 PROCEDURE — G0444 DEPRESSION SCREEN ANNUAL: HCPCS | Performed by: FAMILY MEDICINE

## 2024-05-21 PROCEDURE — G0446 INTENS BEHAVE THER CARDIO DX: HCPCS | Performed by: FAMILY MEDICINE

## 2024-05-21 PROCEDURE — 99397 PER PM REEVAL EST PAT 65+ YR: CPT | Performed by: FAMILY MEDICINE

## 2024-05-21 PROCEDURE — 1170F FXNL STATUS ASSESSED: CPT | Performed by: FAMILY MEDICINE

## 2024-05-21 PROCEDURE — 1159F MED LIST DOCD IN RCRD: CPT | Performed by: FAMILY MEDICINE

## 2024-05-21 PROCEDURE — 1123F ACP DISCUSS/DSCN MKR DOCD: CPT | Performed by: FAMILY MEDICINE

## 2024-05-21 PROCEDURE — 1160F RVW MEDS BY RX/DR IN RCRD: CPT | Performed by: FAMILY MEDICINE

## 2024-05-21 PROCEDURE — G0439 PPPS, SUBSEQ VISIT: HCPCS | Performed by: FAMILY MEDICINE

## 2024-05-21 RX ORDER — AMMONIUM LACTATE 12 G/100G
LOTION TOPICAL AS NEEDED
Qty: 400 G | Refills: 1 | Status: SHIPPED | OUTPATIENT
Start: 2024-05-21

## 2024-05-21 RX ORDER — METFORMIN HYDROCHLORIDE 500 MG/1
1000 TABLET ORAL
Qty: 120 TABLET | Refills: 11 | Status: SHIPPED | OUTPATIENT
Start: 2024-05-21 | End: 2024-05-21 | Stop reason: SDUPTHER

## 2024-05-21 RX ORDER — METFORMIN HYDROCHLORIDE 500 MG/1
1000 TABLET ORAL
Qty: 120 TABLET | Refills: 11 | Status: SHIPPED | OUTPATIENT
Start: 2024-05-21 | End: 2024-05-22 | Stop reason: SDUPTHER

## 2024-05-21 RX ORDER — TRIAMTERENE/HYDROCHLOROTHIAZID 37.5-25 MG
1 TABLET ORAL DAILY
Qty: 90 TABLET | Refills: 1 | Status: SHIPPED | OUTPATIENT
Start: 2024-05-21

## 2024-05-21 RX ORDER — TRIAMCINOLONE ACETONIDE 0.25 MG/G
OINTMENT TOPICAL 2 TIMES DAILY
Qty: 400 G | Refills: 1 | Status: SHIPPED | OUTPATIENT
Start: 2024-05-21

## 2024-05-21 RX ORDER — MELOXICAM 7.5 MG/1
7.5 TABLET ORAL DAILY
Qty: 90 TABLET | Refills: 1 | Status: SHIPPED | OUTPATIENT
Start: 2024-05-21

## 2024-05-21 RX ORDER — ALBUTEROL SULFATE 90 UG/1
2 AEROSOL, METERED RESPIRATORY (INHALATION) EVERY 4 HOURS PRN
Qty: 18 G | Refills: 11 | Status: SHIPPED | OUTPATIENT
Start: 2024-05-21

## 2024-05-21 ASSESSMENT — PATIENT HEALTH QUESTIONNAIRE - PHQ9
SUM OF ALL RESPONSES TO PHQ9 QUESTIONS 1 AND 2: 0
2. FEELING DOWN, DEPRESSED OR HOPELESS: NOT AT ALL
SUM OF ALL RESPONSES TO PHQ9 QUESTIONS 1 AND 2: 0
2. FEELING DOWN, DEPRESSED OR HOPELESS: NOT AT ALL
1. LITTLE INTEREST OR PLEASURE IN DOING THINGS: NOT AT ALL
1. LITTLE INTEREST OR PLEASURE IN DOING THINGS: NOT AT ALL

## 2024-05-21 ASSESSMENT — ENCOUNTER SYMPTOMS
OCCASIONAL FEELINGS OF UNSTEADINESS: 0
LOSS OF SENSATION IN FEET: 0
DEPRESSION: 0

## 2024-05-21 ASSESSMENT — ACTIVITIES OF DAILY LIVING (ADL)
TAKING_MEDICATION: INDEPENDENT
BATHING: INDEPENDENT
DOING_HOUSEWORK: INDEPENDENT
DRESSING: INDEPENDENT
GROCERY_SHOPPING: INDEPENDENT
MANAGING_FINANCES: INDEPENDENT

## 2024-05-21 NOTE — PROGRESS NOTES
"Subjective   Patient ID: Alysia Hart is a 68 y.o. male who presents for Medicare Annual Wellness Visit Initial.    HPI     Review of Systems   All other systems reviewed and are negative.      Objective   /77   Pulse 59   Temp 36.5 °C (97.7 °F)   Ht 1.727 m (5' 8\")   Wt 120 kg (264 lb 12.8 oz)   BMI 40.26 kg/m²     Physical Exam  Cardiovascular:      Rate and Rhythm: Normal rate and regular rhythm.      Heart sounds: Normal heart sounds.   Pulmonary:      Breath sounds: Normal breath sounds.   Abdominal:      Palpations: Abdomen is soft.   Musculoskeletal:         General: Normal range of motion.      Cervical back: Normal range of motion.   Skin:     General: Skin is warm.   Neurological:      General: No focal deficit present.      Mental Status: He is alert.   Psychiatric:         Mood and Affect: Mood normal.         Assessment/Plan     This is a 68-year-old male patient who is here for his Medicare well visit    I calculated his ASCVD which was 25.2      I discussed and assess the  risk factors for cardiovascular disease.  I educated patient on a healthier behavior lifestyle changes.   I advised patient to walk at least 30 minutes a day 5 days a week.  I educated on healthy eating habits and also taking a baby aspirin to avoid cardiovascular accident     Depression screening was negative    I reviewed his medication vital signs are stable with triamterene and will do A1c blood sugar continue metformin the same dose    He has tubular adenoma for which I referred him to have a colonoscopy    Reviewed his diet and exercise    Advised him to use Silver sneakers program for him to get physical activity in    Routine labs are drawn    He is up-to-date on his shots    Advised him to come back in 6 months               "

## 2024-05-21 NOTE — ACP (ADVANCE CARE PLANNING)
Confirming Previous Code Status:   Advance Care Planning Note     Discussion Date: 05/21/24   Discussion Participants: patient    The patient wishes to discuss Advance Care Planning today and the following is a brief summary of our discussion.     Patient has capacity to make their own medical decisions: No  Health Care Agent/Surrogate Decision Maker documented in chart: No    Documents on file and valid:  Advance Directive/Living Will: No   Health Care Power of : No                  Time Statement: Total face to face time spent on advance care planning was 16 minutes  in counseling, including the explanation.    Nuris Long MD  5/21/2024 1:58 PM

## 2024-05-21 NOTE — PATIENT INSTRUCTIONS
Silver sneaker program it is a program that is paid by your insurance for you to have free exercise classes make sure you use that    See the sleep specialist regarding your moderate sleep apnea because sleep apnea can lead you to have stroke or heart attack or even fatal    I have ordered the colonoscopy    I have ordered all your routine lab work    See me back in 6

## 2024-05-22 DIAGNOSIS — E11.9 TYPE 2 DIABETES MELLITUS WITHOUT COMPLICATION, WITHOUT LONG-TERM CURRENT USE OF INSULIN (MULTI): ICD-10-CM

## 2024-05-22 RX ORDER — METFORMIN HYDROCHLORIDE 500 MG/1
1000 TABLET ORAL
Qty: 120 TABLET | Refills: 11 | Status: SHIPPED | OUTPATIENT
Start: 2024-05-22 | End: 2024-06-07 | Stop reason: SDUPTHER

## 2024-06-07 DIAGNOSIS — E11.9 TYPE 2 DIABETES MELLITUS WITHOUT COMPLICATION, WITHOUT LONG-TERM CURRENT USE OF INSULIN (MULTI): ICD-10-CM

## 2024-06-07 RX ORDER — METFORMIN HYDROCHLORIDE 500 MG/1
1000 TABLET ORAL
Qty: 120 TABLET | Refills: 11 | Status: SHIPPED | OUTPATIENT
Start: 2024-06-07 | End: 2024-06-11 | Stop reason: SDUPTHER

## 2024-06-10 DIAGNOSIS — E11.9 TYPE 2 DIABETES MELLITUS WITHOUT COMPLICATION, WITHOUT LONG-TERM CURRENT USE OF INSULIN (MULTI): ICD-10-CM

## 2024-06-11 RX ORDER — METFORMIN HYDROCHLORIDE 500 MG/1
1000 TABLET ORAL
Qty: 120 TABLET | Refills: 11 | Status: SHIPPED | OUTPATIENT
Start: 2024-06-11 | End: 2025-06-11

## 2024-09-03 ENCOUNTER — TELEPHONE (OUTPATIENT)
Dept: GASTROENTEROLOGY | Facility: HOSPITAL | Age: 69
End: 2024-09-03
Payer: MEDICARE

## 2024-10-25 ENCOUNTER — ANESTHESIA EVENT (OUTPATIENT)
Dept: GASTROENTEROLOGY | Facility: HOSPITAL | Age: 69
End: 2024-10-25
Payer: MEDICARE

## 2024-10-25 ENCOUNTER — HOSPITAL ENCOUNTER (OUTPATIENT)
Dept: GASTROENTEROLOGY | Facility: HOSPITAL | Age: 69
Discharge: HOME | End: 2024-10-25
Payer: MEDICARE

## 2024-10-25 ENCOUNTER — ANESTHESIA (OUTPATIENT)
Dept: GASTROENTEROLOGY | Facility: HOSPITAL | Age: 69
End: 2024-10-25
Payer: MEDICARE

## 2024-10-25 VITALS
RESPIRATION RATE: 20 BRPM | TEMPERATURE: 97.5 F | HEIGHT: 69 IN | DIASTOLIC BLOOD PRESSURE: 79 MMHG | WEIGHT: 270 LBS | SYSTOLIC BLOOD PRESSURE: 104 MMHG | BODY MASS INDEX: 39.99 KG/M2 | HEART RATE: 60 BPM | OXYGEN SATURATION: 99 %

## 2024-10-25 DIAGNOSIS — D36.9 TUBULAR ADENOMA: ICD-10-CM

## 2024-10-25 LAB — GLUCOSE BLD MANUAL STRIP-MCNC: 96 MG/DL (ref 74–99)

## 2024-10-25 PROCEDURE — 2500000004 HC RX 250 GENERAL PHARMACY W/ HCPCS (ALT 636 FOR OP/ED)

## 2024-10-25 PROCEDURE — 3700000002 HC GENERAL ANESTHESIA TIME - EACH INCREMENTAL 1 MINUTE

## 2024-10-25 PROCEDURE — 7100000010 HC PHASE TWO TIME - EACH INCREMENTAL 1 MINUTE

## 2024-10-25 PROCEDURE — 3700000001 HC GENERAL ANESTHESIA TIME - INITIAL BASE CHARGE

## 2024-10-25 PROCEDURE — 82947 ASSAY GLUCOSE BLOOD QUANT: CPT

## 2024-10-25 PROCEDURE — 7100000009 HC PHASE TWO TIME - INITIAL BASE CHARGE

## 2024-10-25 PROCEDURE — 45378 DIAGNOSTIC COLONOSCOPY: CPT | Performed by: STUDENT IN AN ORGANIZED HEALTH CARE EDUCATION/TRAINING PROGRAM

## 2024-10-25 RX ORDER — LABETALOL HYDROCHLORIDE 5 MG/ML
5 INJECTION, SOLUTION INTRAVENOUS ONCE AS NEEDED
Status: DISCONTINUED | OUTPATIENT
Start: 2024-10-25 | End: 2024-10-26 | Stop reason: HOSPADM

## 2024-10-25 RX ORDER — ALBUTEROL SULFATE 0.83 MG/ML
2.5 SOLUTION RESPIRATORY (INHALATION) ONCE AS NEEDED
Status: DISCONTINUED | OUTPATIENT
Start: 2024-10-25 | End: 2024-10-26 | Stop reason: HOSPADM

## 2024-10-25 RX ORDER — ONDANSETRON HYDROCHLORIDE 2 MG/ML
4 INJECTION, SOLUTION INTRAVENOUS ONCE AS NEEDED
Status: DISCONTINUED | OUTPATIENT
Start: 2024-10-25 | End: 2024-10-26 | Stop reason: HOSPADM

## 2024-10-25 RX ORDER — HYDRALAZINE HYDROCHLORIDE 20 MG/ML
5 INJECTION INTRAMUSCULAR; INTRAVENOUS EVERY 30 MIN PRN
Status: DISCONTINUED | OUTPATIENT
Start: 2024-10-25 | End: 2024-10-26 | Stop reason: HOSPADM

## 2024-10-25 RX ORDER — LIDOCAINE HYDROCHLORIDE 10 MG/ML
0.1 INJECTION, SOLUTION EPIDURAL; INFILTRATION; INTRACAUDAL; PERINEURAL ONCE
Status: DISCONTINUED | OUTPATIENT
Start: 2024-10-25 | End: 2024-10-26 | Stop reason: HOSPADM

## 2024-10-25 RX ORDER — PROPOFOL 10 MG/ML
INJECTION, EMULSION INTRAVENOUS CONTINUOUS PRN
Status: DISCONTINUED | OUTPATIENT
Start: 2024-10-25 | End: 2024-10-25

## 2024-10-25 RX ORDER — LIDOCAINE HCL/PF 100 MG/5ML
SYRINGE (ML) INTRAVENOUS AS NEEDED
Status: DISCONTINUED | OUTPATIENT
Start: 2024-10-25 | End: 2024-10-25

## 2024-10-25 ASSESSMENT — COLUMBIA-SUICIDE SEVERITY RATING SCALE - C-SSRS
1. IN THE PAST MONTH, HAVE YOU WISHED YOU WERE DEAD OR WISHED YOU COULD GO TO SLEEP AND NOT WAKE UP?: NO
2. HAVE YOU ACTUALLY HAD ANY THOUGHTS OF KILLING YOURSELF?: NO
6. HAVE YOU EVER DONE ANYTHING, STARTED TO DO ANYTHING, OR PREPARED TO DO ANYTHING TO END YOUR LIFE?: NO

## 2024-10-25 ASSESSMENT — PAIN - FUNCTIONAL ASSESSMENT
PAIN_FUNCTIONAL_ASSESSMENT: 0-10

## 2024-10-25 ASSESSMENT — PAIN SCALES - GENERAL
PAINLEVEL_OUTOF10: 0 - NO PAIN

## 2024-10-25 NOTE — ANESTHESIA POSTPROCEDURE EVALUATION
Patient: Alysia Hart    Procedure Summary       Date: 10/25/24 Room / Location: [facility]    Anesthesia Start: 1410 Anesthesia Stop: 1432    Procedure: COLONOSCOPY Diagnosis: Tubular adenoma    Scheduled Providers: Ameya Diego MD Responsible Provider: Tara Gann MD    Anesthesia Type: MAC ASA Status: 3            Anesthesia Type: MAC    Vitals Value Taken Time   /79 10/25/24 1504   Temp 36.4 °C (97.5 °F) 10/25/24 1435   Pulse 60 10/25/24 1504   Resp 20 10/25/24 1504   SpO2 99 % 10/25/24 1504       Anesthesia Post Evaluation    Patient location during evaluation: PACU  Patient participation: complete - patient participated  Level of consciousness: awake and alert  Pain management: adequate  Airway patency: patent  Cardiovascular status: acceptable  Respiratory status: acceptable  Hydration status: acceptable  Postoperative Nausea and Vomiting: none        There were no known notable events for this encounter.

## 2024-10-25 NOTE — H&P
History Of Present Illness  Alysia Hart is a 69 y.o. male presenting with PMH of HTN, T2DM and tubular adenoma, who presents for colonoscopy for tubular adenoma.     Past Medical History  Past Medical History:   Diagnosis Date    Acute atopic conjunctivitis, bilateral 09/28/2021    Allergic conjunctivitis of both eyes    Age-related nuclear cataract, left eye 01/22/2019    Cataract, nuclear sclerotic, left eye    Anxiety disorder, unspecified 03/19/2019    Anxiety and depression    Body mass index (BMI)40.0-44.9, adult 09/08/2021    BMI 40.0-44.9, adult    Chronic obstructive pulmonary disease, unspecified 06/08/2021    COPD, moderate    Combined forms of age-related cataract, left eye 10/19/2021    Combined form of age-related cataract, left eye    Encounter for disability determination 12/09/2020    Disability examination    Encounter for screening for cardiovascular disorders 01/17/2019    Screening for AAA (abdominal aortic aneurysm)    Hypertension     Non-pressure chronic ulcer of right calf with unspecified severity (Multi) 09/06/2019    Non-pressure chronic ulcer of calf, right, with unspecified severity    Obesity, unspecified 02/08/2021    Obesity, Class II, BMI 35-39.9    Other age-related cataract 02/05/2019    Cataract mature, total senile    Other injury of unspecified body region, initial encounter 08/20/2019    Wound, open    Other secondary cataract, right eye 12/19/2022    PCO (posterior capsular opacification), right    Paresthesia of skin 09/08/2021    Paresthesia of foot    Personal history of diseases of the blood and blood-forming organs and certain disorders involving the immune mechanism 03/02/2022    History of anemia    Personal history of other diseases of the circulatory system 09/01/2020    History of peripheral arterial disease    Personal history of other diseases of the nervous system and sense organs 09/17/2021    History of peripheral neuropathy    Personal history of other  diseases of the nervous system and sense organs 01/22/2019    History of cataract    Personal history of other diseases of the nervous system and sense organs 08/04/2020    History of blurred vision    Personal history of other diseases of the nervous system and sense organs 04/17/2020    History of blurred vision    Personal history of other diseases of the nervous system and sense organs 08/04/2020    History of blurred vision    Personal history of other diseases of the nervous system and sense organs 05/19/2021    History of peripheral neuropathy    Personal history of other endocrine, nutritional and metabolic disease 04/18/2022    History of hyperglycemia    Personal history of other specified conditions 06/08/2021    History of abdominal pain    Personal history of other specified conditions 04/02/2019    History of shortness of breath    Personal history of other specified conditions 06/08/2021    History of abdominal pain    Prediabetes 06/08/2021    Prediabetes    Shortness of breath     Short of breath on exertion    Shortness of breath 11/12/2020    SOB (shortness of breath) on exertion    Shortness of breath 10/12/2020    SOB (shortness of breath) on exertion    Shortness of breath 08/19/2020    SOB (shortness of breath) on exertion    Unspecified asthma with (acute) exacerbation ([facility]HS-HCC) 09/07/2022    Acute asthma exacerbation    Unspecified asthma with (acute) exacerbation ([facility]) 07/27/2022    Acute asthma exacerbation    Venous insufficiency (chronic) (peripheral) 01/06/2023    Venous insufficiency     Surgical History  Past Surgical History:   Procedure Laterality Date    OTHER SURGICAL HISTORY  03/21/2019    Cataract surgery     Social History  He reports that he has quit smoking. His smoking use included cigarettes. He quit smokeless tobacco use about 4 years ago. He reports current alcohol use of about 1.0 standard drink of alcohol per week. He reports current drug use. Frequency: 1.00 time  "per week. Drug: Marijuana.    Family History  Family History   Problem Relation Name Age of Onset    Other (cardiac disorder) Brother          Allergies  No Known Allergies  Review of Systems     Physical Exam  Constitutional:       General: He is not in acute distress.     Appearance: Normal appearance.   HENT:      Head: Normocephalic and atraumatic.   Eyes:      Extraocular Movements: Extraocular movements intact.      Conjunctiva/sclera: Conjunctivae normal.   Pulmonary:      Effort: Pulmonary effort is normal. No respiratory distress.   Abdominal:      General: There is no distension.      Palpations: Abdomen is soft.   Neurological:      Mental Status: He is alert and oriented to person, place, and time.          Last Recorded Vitals  Blood pressure 134/88, pulse 63, temperature 36.2 °C (97.2 °F), temperature source Temporal, resp. rate 16, height 1.753 m (5' 9\"), weight 122 kg (270 lb), SpO2 99%.    Assessment/Plan   Alysia Hart is a 69 y.o. male presenting with PMH of HTN, T2DM and tubular adenoma, who presents for colonoscopy for tubular adenoma.     Ameya Diego MD  "

## 2024-10-25 NOTE — ANESTHESIA PREPROCEDURE EVALUATION
Patient: Alysia Hart    Procedure Information       Date/Time: 10/25/24 1400    Scheduled providers: Ameya Diego MD; Tari Blanco MD    Procedure: COLONOSCOPY    Location: [facility]            Relevant Problems   Cardiac   (+) Atrial fibrillation (Multi)   (+) Benign essential HTN      Pulmonary   (+) Asthmatic bronchitis (HHS-HCC)   (+) COPD, moderate (Multi)   (+) Obstructive sleep apnea of adult   (+) SOB (shortness of breath) on exertion      Neuro   (+) Anxiety and depression   (+) Idiopathic progressive polyneuropathy   (+) Peripheral neuropathy      GI   (+) Gastroesophageal reflux disease      Endocrine   (+) Class 2 obesity with body mass index (BMI) of 37.0 to 37.9 in adult   (+) Morbid obesity (Multi)      Hematology   (+) Anemia   (+) Anemia due to chronic kidney disease, on chronic dialysis (Multi)      Musculoskeletal   (+) Osteoarthritis   (+) Primary osteoarthritis of both knees      ID   (+) Dermatophytosis of foot   (+) Onychomycosis       Clinical information reviewed:                   NPO Detail:  No data recorded     Physical Exam    Airway  Mallampati: I  TM distance: >3 FB     Cardiovascular - normal exam     Dental   (+) upper dentures, lower dentures     Pulmonary - normal exam     Abdominal - normal exam             Anesthesia Plan    History of general anesthesia?: yes  History of complications of general anesthesia?: no    ASA 3     MAC     intravenous induction   Anesthetic plan and risks discussed with patient.  Use of blood products discussed with patient who.    Plan discussed with CRNA.

## 2024-10-27 ASSESSMENT — PAIN SCALES - GENERAL: PAINLEVEL_OUTOF10: 0 - NO PAIN

## 2024-11-18 ENCOUNTER — APPOINTMENT (OUTPATIENT)
Dept: PRIMARY CARE | Facility: CLINIC | Age: 69
End: 2024-11-18
Payer: MEDICARE

## 2024-12-16 ENCOUNTER — APPOINTMENT (OUTPATIENT)
Dept: OPHTHALMOLOGY | Facility: CLINIC | Age: 69
End: 2024-12-16
Payer: MEDICARE

## 2025-01-20 ENCOUNTER — APPOINTMENT (OUTPATIENT)
Dept: PRIMARY CARE | Facility: CLINIC | Age: 70
End: 2025-01-20
Payer: MEDICARE

## 2025-01-20 VITALS
BODY MASS INDEX: 39.72 KG/M2 | HEART RATE: 71 BPM | SYSTOLIC BLOOD PRESSURE: 130 MMHG | OXYGEN SATURATION: 95 % | DIASTOLIC BLOOD PRESSURE: 74 MMHG | WEIGHT: 269 LBS | TEMPERATURE: 96 F

## 2025-01-20 DIAGNOSIS — Z87.891 PERSONAL HISTORY OF NICOTINE DEPENDENCE: ICD-10-CM

## 2025-01-20 DIAGNOSIS — J42 CHRONIC BRONCHITIS, UNSPECIFIED CHRONIC BRONCHITIS TYPE (MULTI): ICD-10-CM

## 2025-01-20 DIAGNOSIS — F17.200 SMOKING: Primary | ICD-10-CM

## 2025-01-20 PROBLEM — E66.01 MORBID OBESITY (MULTI): Status: RESOLVED | Noted: 2023-02-10 | Resolved: 2025-01-20

## 2025-01-20 PROBLEM — Z99.2 ANEMIA DUE TO CHRONIC KIDNEY DISEASE, ON CHRONIC DIALYSIS (MULTI): Status: RESOLVED | Noted: 2023-02-10 | Resolved: 2025-01-20

## 2025-01-20 PROBLEM — N18.6 ANEMIA DUE TO CHRONIC KIDNEY DISEASE, ON CHRONIC DIALYSIS (MULTI): Status: RESOLVED | Noted: 2023-02-10 | Resolved: 2025-01-20

## 2025-01-20 PROBLEM — I48.91 ATRIAL FIBRILLATION (MULTI): Status: RESOLVED | Noted: 2023-02-10 | Resolved: 2025-01-20

## 2025-01-20 PROBLEM — D63.1 ANEMIA DUE TO CHRONIC KIDNEY DISEASE, ON CHRONIC DIALYSIS (MULTI): Status: RESOLVED | Noted: 2023-02-10 | Resolved: 2025-01-20

## 2025-01-20 PROBLEM — E11.9 DIABETES MELLITUS (MULTI): Status: RESOLVED | Noted: 2023-02-10 | Resolved: 2025-01-20

## 2025-01-20 PROBLEM — L97.219: Status: RESOLVED | Noted: 2023-08-22 | Resolved: 2025-01-20

## 2025-01-20 PROCEDURE — 3078F DIAST BP <80 MM HG: CPT | Performed by: FAMILY MEDICINE

## 2025-01-20 PROCEDURE — 99214 OFFICE O/P EST MOD 30 MIN: CPT | Performed by: FAMILY MEDICINE

## 2025-01-20 PROCEDURE — 3075F SYST BP GE 130 - 139MM HG: CPT | Performed by: FAMILY MEDICINE

## 2025-01-20 PROCEDURE — 1159F MED LIST DOCD IN RCRD: CPT | Performed by: FAMILY MEDICINE

## 2025-01-20 PROCEDURE — 1036F TOBACCO NON-USER: CPT | Performed by: FAMILY MEDICINE

## 2025-01-20 RX ORDER — MOMETASONE FUROATE 220 UG/1
2 INHALANT RESPIRATORY (INHALATION)
Qty: 1 EACH | Refills: 3 | Status: SHIPPED | OUTPATIENT
Start: 2025-01-20

## 2025-01-20 RX ORDER — ALBUTEROL SULFATE 90 UG/1
2 INHALANT RESPIRATORY (INHALATION) EVERY 4 HOURS PRN
Qty: 18 G | Refills: 11 | Status: SHIPPED | OUTPATIENT
Start: 2025-01-20

## 2025-01-20 NOTE — PATIENT INSTRUCTIONS
You have to take 2 inhalers    The one you showed me is Albuterol --- to be used only when needed for shortness of breath or coughing and I renewed that    Every day you need to be on maintenance inhaler called Asmanex or you will get the generic of it called mometasone 2 puffs twice a day you do not wait for a bad day you got to keep using it every day    Continue with your metformin and triamterene    Please make sure you get your stockings.  Schedule your CAT scan for your lung after February 2        And I will see you for a full physical exam in March schedule that before you leave the office

## 2025-01-20 NOTE — PROGRESS NOTES
This is a 69-year-old male patient who is here for follow-up from the emergency room    He was recently seen for exacerbation of chronic bronchitis    He still has a cough and on exam auscultation of the lung he has slight wheeze    I educated patient he needs to take Asmanex as maintenance and albuterol only when necessary    Also he has stasis edema bilaterally reminded patient to wear his stockings    Because of the history of chronic smoking which he has given up 5 years ago repeat CAT scan of the lung--to be done after February 2    He is to continue metformin for diabetes and triamterene for hypertension    Advised him to make a annual physical appointment

## 2025-02-03 ENCOUNTER — OFFICE VISIT (OUTPATIENT)
Dept: URGENT CARE | Age: 70
End: 2025-02-03
Payer: MEDICARE

## 2025-02-03 ENCOUNTER — HOSPITAL ENCOUNTER (OUTPATIENT)
Dept: RADIOLOGY | Facility: HOSPITAL | Age: 70
Discharge: HOME | End: 2025-02-03
Payer: MEDICARE

## 2025-02-03 VITALS
RESPIRATION RATE: 18 BRPM | SYSTOLIC BLOOD PRESSURE: 139 MMHG | DIASTOLIC BLOOD PRESSURE: 79 MMHG | WEIGHT: 269 LBS | HEART RATE: 83 BPM | TEMPERATURE: 97.7 F | OXYGEN SATURATION: 95 % | BODY MASS INDEX: 39.72 KG/M2

## 2025-02-03 DIAGNOSIS — F17.200 SMOKING: ICD-10-CM

## 2025-02-03 DIAGNOSIS — R05.9 COUGH, UNSPECIFIED TYPE: ICD-10-CM

## 2025-02-03 DIAGNOSIS — J44.1 COPD EXACERBATION (MULTI): Primary | ICD-10-CM

## 2025-02-03 DIAGNOSIS — Z87.891 PERSONAL HISTORY OF NICOTINE DEPENDENCE: ICD-10-CM

## 2025-02-03 LAB
POC RAPID INFLUENZA A: NEGATIVE
POC RAPID INFLUENZA B: NEGATIVE
POC SARS-COV-2 AG BINAX: NORMAL

## 2025-02-03 PROCEDURE — 71271 CT THORAX LUNG CANCER SCR C-: CPT | Performed by: STUDENT IN AN ORGANIZED HEALTH CARE EDUCATION/TRAINING PROGRAM

## 2025-02-03 PROCEDURE — 71271 CT THORAX LUNG CANCER SCR C-: CPT

## 2025-02-03 RX ORDER — AZITHROMYCIN 250 MG/1
250 TABLET, FILM COATED ORAL DAILY
Qty: 5 TABLET | Refills: 0 | Status: SHIPPED | OUTPATIENT
Start: 2025-02-03 | End: 2025-02-08

## 2025-02-03 ASSESSMENT — ENCOUNTER SYMPTOMS
SINUS PAIN: 1
SHORTNESS OF BREATH: 1
SINUS PRESSURE: 1
COUGH: 1

## 2025-02-03 ASSESSMENT — PAIN SCALES - GENERAL: PAINLEVEL_OUTOF10: 0-NO PAIN

## 2025-02-03 NOTE — PROGRESS NOTES
Subjective   Patient ID: Alysia Hart is a 69 y.o. male. They present today with a chief complaint of No chief complaint on file. Cough, sinus pain and pressure, shortness of breath for the past few days. Hx of copd. No cp    Past Medical History  Allergies as of 02/03/2025    (No Known Allergies)       (Not in a hospital admission)       Past Medical History:   Diagnosis Date    Acute atopic conjunctivitis, bilateral 09/28/2021    Allergic conjunctivitis of both eyes    Age-related nuclear cataract, left eye 01/22/2019    Cataract, nuclear sclerotic, left eye    Anxiety disorder, unspecified 03/19/2019    Anxiety and depression    Body mass index (BMI)40.0-44.9, adult 09/08/2021    BMI 40.0-44.9, adult    Chronic obstructive pulmonary disease, unspecified 06/08/2021    COPD, moderate    Combined forms of age-related cataract, left eye 10/19/2021    Combined form of age-related cataract, left eye    Encounter for disability determination 12/09/2020    Disability examination    Encounter for screening for cardiovascular disorders 01/17/2019    Screening for AAA (abdominal aortic aneurysm)    Hypertension     Non-pressure chronic ulcer of right calf with unspecified severity (Multi) 09/06/2019    Non-pressure chronic ulcer of calf, right, with unspecified severity    Obesity, unspecified 02/08/2021    Obesity, Class II, BMI 35-39.9    Other age-related cataract 02/05/2019    Cataract mature, total senile    Other injury of unspecified body region, initial encounter 08/20/2019    Wound, open    Other secondary cataract, right eye 12/19/2022    PCO (posterior capsular opacification), right    Paresthesia of skin 09/08/2021    Paresthesia of foot    Personal history of diseases of the blood and blood-forming organs and certain disorders involving the immune mechanism 03/02/2022    History of anemia    Personal history of other diseases of the circulatory system 09/01/2020    History of peripheral arterial disease     Personal history of other diseases of the nervous system and sense organs 09/17/2021    History of peripheral neuropathy    Personal history of other diseases of the nervous system and sense organs 01/22/2019    History of cataract    Personal history of other diseases of the nervous system and sense organs 08/04/2020    History of blurred vision    Personal history of other diseases of the nervous system and sense organs 04/17/2020    History of blurred vision    Personal history of other diseases of the nervous system and sense organs 08/04/2020    History of blurred vision    Personal history of other diseases of the nervous system and sense organs 05/19/2021    History of peripheral neuropathy    Personal history of other endocrine, nutritional and metabolic disease 04/18/2022    History of hyperglycemia    Personal history of other specified conditions 06/08/2021    History of abdominal pain    Personal history of other specified conditions 04/02/2019    History of shortness of breath    Personal history of other specified conditions 06/08/2021    History of abdominal pain    Prediabetes 06/08/2021    Prediabetes    Shortness of breath     Short of breath on exertion    Shortness of breath 11/12/2020    SOB (shortness of breath) on exertion    Shortness of breath 10/12/2020    SOB (shortness of breath) on exertion    Shortness of breath 08/19/2020    SOB (shortness of breath) on exertion    Unspecified asthma with (acute) exacerbation (HHS-HCC) 09/07/2022    Acute asthma exacerbation    Unspecified asthma with (acute) exacerbation (HHS-HCC) 07/27/2022    Acute asthma exacerbation    Venous insufficiency (chronic) (peripheral) 01/06/2023    Venous insufficiency       Past Surgical History:   Procedure Laterality Date    OTHER SURGICAL HISTORY  03/21/2019    Cataract surgery        reports that he quit smoking about 5 years ago. His smoking use included cigarettes. He started smoking about 46 years ago. He has a  41 pack-year smoking history. He quit smokeless tobacco use about 5 years ago. He reports current alcohol use of about 1.0 standard drink of alcohol per week. He reports current drug use. Frequency: 1.00 time per week. Drug: Marijuana.    Review of Systems  Review of Systems   HENT:  Positive for sinus pressure and sinus pain.    Respiratory:  Positive for cough and shortness of breath.    All other systems reviewed and are negative.                                 Objective    There were no vitals filed for this visit.  No LMP for male patient.    Physical Exam  Vitals and nursing note reviewed.   Constitutional:       Appearance: Normal appearance.   Eyes:      Conjunctiva/sclera: Conjunctivae normal.   Cardiovascular:      Rate and Rhythm: Normal rate.   Pulmonary:      Effort: Pulmonary effort is normal.      Breath sounds: Rhonchi present.   Neurological:      Mental Status: He is alert.         Procedures    Point of Care Test & Imaging Results from this visit  No results found for this visit on 02/03/25.   No results found.    Diagnostic study results (if any) were reviewed by Conor Toledo PA-C.    Assessment/Plan   Allergies, medications, history, and pertinent labs/EKGs/Imaging reviewed by Conor Toledo PA-C.     Medical Decision Making  Copd exacerbation  Will cover with azithromycin for prophylaxis.   No resp distress of hypoxia.   Continue breathing treatments.   Follow up with pcp  Covid/flu negative  Orders and Diagnoses  There are no diagnoses linked to this encounter.    Medical Admin Record      Patient disposition: Home    Electronically signed by Conor Toledo PA-C  6:06 PM

## 2025-04-16 ENCOUNTER — APPOINTMENT (OUTPATIENT)
Dept: PRIMARY CARE | Facility: CLINIC | Age: 70
End: 2025-04-16
Payer: MEDICARE

## 2025-04-16 VITALS
OXYGEN SATURATION: 96 % | WEIGHT: 257.9 LBS | BODY MASS INDEX: 38.2 KG/M2 | SYSTOLIC BLOOD PRESSURE: 132 MMHG | TEMPERATURE: 97.2 F | HEART RATE: 76 BPM | DIASTOLIC BLOOD PRESSURE: 90 MMHG | HEIGHT: 69 IN

## 2025-04-16 DIAGNOSIS — R21 RASH: ICD-10-CM

## 2025-04-16 DIAGNOSIS — G47.33 OBSTRUCTIVE SLEEP APNEA OF ADULT: ICD-10-CM

## 2025-04-16 DIAGNOSIS — J42 CHRONIC BRONCHITIS, UNSPECIFIED CHRONIC BRONCHITIS TYPE (MULTI): ICD-10-CM

## 2025-04-16 DIAGNOSIS — Z00.00 ROUTINE GENERAL MEDICAL EXAMINATION AT A HEALTH CARE FACILITY: ICD-10-CM

## 2025-04-16 DIAGNOSIS — I10 HYPERTENSION, UNSPECIFIED TYPE: ICD-10-CM

## 2025-04-16 DIAGNOSIS — Z13.220 LIPID SCREENING: ICD-10-CM

## 2025-04-16 DIAGNOSIS — E55.9 VITAMIN D DEFICIENCY: ICD-10-CM

## 2025-04-16 DIAGNOSIS — E11.9 TYPE 2 DIABETES MELLITUS WITHOUT COMPLICATION, WITHOUT LONG-TERM CURRENT USE OF INSULIN: ICD-10-CM

## 2025-04-16 DIAGNOSIS — Z71.89 HEARING AID CONSULTATION: Primary | ICD-10-CM

## 2025-04-16 DIAGNOSIS — M25.569 ARTHRALGIA OF KNEE, UNSPECIFIED LATERALITY: ICD-10-CM

## 2025-04-16 DIAGNOSIS — E66.812 OBESITY, CLASS II, BMI 35-39.9: ICD-10-CM

## 2025-04-16 RX ORDER — MOMETASONE FUROATE 220 UG/1
2 INHALANT RESPIRATORY (INHALATION)
Qty: 1 EACH | Refills: 3 | Status: SHIPPED | OUTPATIENT
Start: 2025-04-16

## 2025-04-16 RX ORDER — TRIAMTERENE/HYDROCHLOROTHIAZID 37.5-25 MG
1 TABLET ORAL DAILY
Qty: 90 TABLET | Refills: 1 | Status: SHIPPED | OUTPATIENT
Start: 2025-04-16

## 2025-04-16 RX ORDER — ASPIRIN 81 MG/1
81 TABLET ORAL DAILY
Qty: 100 TABLET | Refills: 3 | Status: SHIPPED | OUTPATIENT
Start: 2025-04-16

## 2025-04-16 RX ORDER — ALBUTEROL SULFATE 90 UG/1
2 INHALANT RESPIRATORY (INHALATION) EVERY 4 HOURS PRN
Qty: 18 G | Refills: 11 | Status: SHIPPED | OUTPATIENT
Start: 2025-04-16

## 2025-04-16 RX ORDER — MELOXICAM 7.5 MG/1
7.5 TABLET ORAL DAILY
Qty: 90 TABLET | Refills: 1 | Status: SHIPPED | OUTPATIENT
Start: 2025-04-16

## 2025-04-16 RX ORDER — METFORMIN HYDROCHLORIDE 500 MG/1
1000 TABLET ORAL
Qty: 120 TABLET | Refills: 11 | Status: SHIPPED | OUTPATIENT
Start: 2025-04-16 | End: 2026-04-16

## 2025-04-16 ASSESSMENT — ENCOUNTER SYMPTOMS
OCCASIONAL FEELINGS OF UNSTEADINESS: 0
DEPRESSION: 0
LOSS OF SENSATION IN FEET: 0

## 2025-04-16 ASSESSMENT — PATIENT HEALTH QUESTIONNAIRE - PHQ9
2. FEELING DOWN, DEPRESSED OR HOPELESS: NOT AT ALL
1. LITTLE INTEREST OR PLEASURE IN DOING THINGS: NOT AT ALL
SUM OF ALL RESPONSES TO PHQ9 QUESTIONS 1 AND 2: 0

## 2025-04-16 ASSESSMENT — PAIN SCALES - GENERAL: PAINLEVEL_OUTOF10: 0-NO PAIN

## 2025-04-16 NOTE — PROGRESS NOTES
"Subjective   Patient ID: Alysia Hart is a 69 y.o. male who presents for [facility] wellness.    HPI     Review of Systems   All other systems reviewed and are negative.      Objective   /90   Pulse 76   Temp 36.2 °C (97.2 °F)   Ht 1.74 m (5' 8.5\")   Wt 117 kg (257 lb 14.4 oz)   SpO2 96%   BMI 38.64 kg/m²     Physical Exam  Cardiovascular:      Rate and Rhythm: Regular rhythm.      Heart sounds: Normal heart sounds.   Pulmonary:      Breath sounds: Normal breath sounds.   Abdominal:      Palpations: Abdomen is soft.   Musculoskeletal:         General: Normal range of motion.   Skin:     General: Skin is warm.   Neurological:      General: No focal deficit present.      Mental Status: He is alert.   Psychiatric:         Mood and Affect: Mood normal.         Assessment/Plan     This is a 69-year-old male patient who is here for his annual well exam    I spoke to the patient explained what a living will is, and medical power of .  I also advised the patient how to get a living will.  Explained to patient the importance of having the living will or medical power of .  16 minutes    Depression screen 5 minutes and negative    ASCVD--18.6%      I discussed and assess the  risk factors for cardiovascular disease.  I educated patient on a healthier behavior lifestyle changes.   I advised patient to walk at least 30 minutes a day 5 days a week.  I educated on healthy eating habits and also taking a baby aspirin to avoid cardiovascular accident--15 minutes    Morbid obesity talk to him at length about using Silver sneakers getting to the gym and also I referred him to clinical pharmacy since he might be a candidate for the GLP-1 he has diabetes and sleep apnea--that is 15 minutes counseling    Allergy asthma he has rhinorrhea but does not want to use any nasal spray advised patient to take over-the-counter antihistamine    Also renewed the albuterol and Asmanex prescription    He does not smoke anymore " and also alcohol intake is very minimal    I reviewed all the medication    He promised to go to the main campus lab to get his blood draw    Reminded patient to get eye doctor appointment        Follow-up in 6 months

## 2025-04-19 LAB
25(OH)D3+25(OH)D2 SERPL-MCNC: 29 NG/ML (ref 30–100)
CHOLEST SERPL-MCNC: 160 MG/DL
CHOLEST/HDLC SERPL: 2.8 (CALC)
HDLC SERPL-MCNC: 58 MG/DL
LDLC SERPL CALC-MCNC: 75 MG/DL (CALC)
NONHDLC SERPL-MCNC: 102 MG/DL (CALC)
PSA SERPL-MCNC: 0.34 NG/ML
TRIGL SERPL-MCNC: 174 MG/DL
TSH SERPL-ACNC: 2.02 MIU/L (ref 0.4–4.5)

## 2025-04-22 ENCOUNTER — APPOINTMENT (OUTPATIENT)
Dept: PHARMACY | Facility: HOSPITAL | Age: 70
End: 2025-04-22
Payer: MEDICARE

## 2025-04-22 DIAGNOSIS — G47.33 OBSTRUCTIVE SLEEP APNEA OF ADULT: ICD-10-CM

## 2025-04-22 DIAGNOSIS — E11.9 TYPE 2 DIABETES MELLITUS WITHOUT COMPLICATION, WITHOUT LONG-TERM CURRENT USE OF INSULIN: Primary | ICD-10-CM

## 2025-04-22 DIAGNOSIS — E66.812 OBESITY, CLASS II, BMI 35-39.9: ICD-10-CM

## 2025-04-22 RX ORDER — TIRZEPATIDE 2.5 MG/.5ML
2.5 INJECTION, SOLUTION SUBCUTANEOUS WEEKLY
Qty: 2 ML | Refills: 1 | Status: SHIPPED | OUTPATIENT
Start: 2025-04-22

## 2025-04-22 NOTE — ASSESSMENT & PLAN NOTE
Patient's goal A1c is < 7%.  Is pt at goal? N/A Patient is due for updated A1c.   Patient's SMBGs are Unavailable as patient does not check his BG.     Rationale for plan: Patient is overweight and has comorbidity of T2DM. Patient is overdue for A1c and does not check his BG at this time. Patient interested in weight loss as well as DM control.     Medication Changes:  CONTINUE  Metformin 1000 mg BID   START  Mounjaro 2.5 mg weekly    Future Considerations:  Patient educated on BG checking via telehealth.  May benefit from in person education at PCP office.     Monitoring and Education:   BG targets and A1C goals reviewed with the patient. , MOA of all medications reviewed. , SE of mounjaro discussed. , Hypoglycemia s/s and treatment reviewed. , Medication administration reviewed. , All patient questions were answered and discussed., and Patient expressed understanding.     Patient due for updated A1c and ACR.

## 2025-04-22 NOTE — PROGRESS NOTES
"  Clinical Pharmacy Appointment    Patient ID: Alysia Hart is a 69 y.o. male who presents for Diabetes.    Pt is here for First appointment.      Referring Provider: Nuris Long  PCP: Nuris Long MD   Last visit with PCP: 4/16/25   Next visit with PCP: 10/14/25      Subjective     HPI  DIABETES MELLITUS TYPE 2:    Known diabetic complications: No.  Does patient follow with Endocrinology: No  Last optometry exam: 8 months ago, cataract surgery   Most recent visit in Podiatry: Upcoming in next few weeks-- patient denies sores or cuts on feet today      Current diabetic medications include:  Metformin 1000 mg BID     Clarifications to above regimen: None  Adverse Effects: None     Past diabetic medications include:  None    Glucose Readings:  Fingerstick/Glucometer  Patient tests BG 0 times per day    Current home BG readings:   Fasting: N/A   Post Prandial: N.A  Previous home BG readings:   Fasting: N/A   Post Prandial: N/A    Any episodes of hypoglycemia? N/A.    Did patient treat episode of hypoglycemia appropriately? N/A  Does the patient have a prescription for ready-to-use Glucagon? N/a  Does pt have proteinuria? N/A No results found for: \"MICROALBCREA\"       Lifestyle:  Diet: 2 meals/day. Usually home cooked  BK: Fish and grits, pancakes  LN: usually skips, sandwich (turkey)   DN: pork chops, potatoes, baked beans   Snacks: brownies, sweets, cookies  Drinks: water, milk, iced tea/pop rare  Physical Activity: None, might walk every other day     Secondary Prevention:  Statin? No  ACE-I/ARB? No  Aspirin? No    Pertinent PMH Review:  PMH of Pancreatitis: No  PMH of Retinopathy: No  PMH of Urinary Tract Infections: No  PMH of MTC: No       Drug Interactions  No relevant drug interactions were noted.    Medication System Management  Patient's preferred pharmacy: [facility]  Affordability/Accessibility: none at this time      Objective   Allergies[1]  Social History     Social " History Narrative    Not on file      Medication Review  Current Outpatient Medications   Medication Instructions    acetaminophen (TYLENOL) 650 mg, Every 6 hours    albuterol (Ventolin HFA) 90 mcg/actuation inhaler 2 puffs, inhalation, Every 4 hours PRN    albuterol 5 mg/mL nebulizer solution Mix 0.25 mL (1.25 mg) into 3 mL of saline and administer via nebulizer every 4 hours as needed for cough or wheezing.    aspirin 81 mg, oral, Daily    azelastine (Optivar) 0.05 % ophthalmic solution 1 drop, 2 times daily PRN    cholecalciferol (Vitamin D-3) 25 MCG (1000 UT) tablet 1 tablet, Daily    cholecalciferol (VITAMIN D-3) 2,000 Units, Daily RT    clotrimazole (Lotrimin) 1 % cream 2 times daily    cyclopentolate (Cyclogyl) 1 % ophthalmic solution 1 drop, 2 times daily    ergocalciferol (VITAMIN D-2) 50,000 Units, Once Weekly    lancets 33 gauge misc Use everyday to test sugar.    meloxicam (MOBIC) 7.5 mg, oral, Daily    metFORMIN (GLUCOPHAGE) 1,000 mg, oral, 2 times daily (morning and late afternoon)    mometasone (Asmanex Twisthaler) 220 mcg/ actuation (60) inhaler 2 puffs, inhalation, Daily RT    Mounjaro 2.5 mg, subcutaneous, Weekly    OneTouch Ultra Test strip Use to test sugar.    sildenafil (VIAGRA) 100 mg, oral, Daily PRN    triamterene-hydrochlorothiazid (Maxzide-25) 37.5-25 mg tablet 1 tablet, oral, Daily      Vitals  BP Readings from Last 2 Encounters:   04/16/25 132/90   02/03/25 139/79     BMI Readings from Last 1 Encounters:   04/16/25 38.64 kg/m²      Labs  A1C  Lab Results   Component Value Date    HGBA1C 6.2 12/14/2022    HGBA1C 6.4 09/07/2022    HGBA1C 6.1 (A) 03/09/2022     BMP  Lab Results   Component Value Date    CALCIUM 9.2 05/21/2024     05/21/2024    K 4.4 05/21/2024    CO2 26 05/21/2024     05/21/2024    BUN 12 05/21/2024    CREATININE 0.83 05/21/2024    EGFR >90 05/21/2024     LFTs  Lab Results   Component Value Date    ALT 16 05/21/2024    AST 20 05/21/2024    ALKPHOS 67 05/21/2024  "   BILITOT 0.5 05/21/2024     FLP  Lab Results   Component Value Date    TRIG 174 (H) 04/18/2025    CHOL 160 04/18/2025    LDLF 56 03/09/2022    LDLCALC 75 04/18/2025    HDL 58 04/18/2025     Urine Microalbumin  No results found for: \"MICROALBCREA\"  Weight Management  Wt Readings from Last 3 Encounters:   04/16/25 117 kg (257 lb 14.4 oz)   02/03/25 122 kg (269 lb)   01/20/25 122 kg (269 lb)      There is no height or weight on file to calculate BMI.     Assessment/Plan   Problem List Items Addressed This Visit       Obesity, Class II, BMI 35-39.9    Patient's goal A1c is < 7%.  Is pt at goal? N/A Patient is due for updated A1c.   Patient's SMBGs are Unavailable as patient does not check his BG.     Rationale for plan: Patient is overweight and has comorbidity of T2DM. Patient is overdue for A1c and does not check his BG at this time. Patient interested in weight loss as well as DM control.     Medication Changes:  CONTINUE  Metformin 1000 mg BID   START  Mounjaro 2.5 mg weekly    Future Considerations:  Patient educated on BG checking via telehealth.  May benefit from in person education at PCP office.     Monitoring and Education:   BG targets and A1C goals reviewed with the patient. , MOA of all medications reviewed. , SE of mounjaro discussed. , Hypoglycemia s/s and treatment reviewed. , Medication administration reviewed. , All patient questions were answered and discussed., and Patient expressed understanding.     Patient due for updated A1c and ACR.          Relevant Medications    tirzepatide (Mounjaro) 2.5 mg/0.5 mL pen injector    Other Relevant Orders    Referral to Clinical Pharmacy    Obstructive sleep apnea of adult    Relevant Orders    Referral to Clinical Pharmacy     Other Visit Diagnoses         Type 2 diabetes mellitus without complication, without long-term current use of insulin    -  Primary    Relevant Medications    tirzepatide (Mounjaro) 2.5 mg/0.5 mL pen injector    Other Relevant Orders    " Referral to Clinical Pharmacy            Clinical Pharmacist follow-up: 5/13/25 @ 1:40pm, Telehealth visit    Continue all meds under the continuation of care with the referring provider and clinical pharmacy team.    Thank you,  Jennifer Carranza, PharmD  Clinical Pharmacist  [phone] (Office Phone)   [phone] (Fax)   [email or web address]     Verbal consent to manage patient's drug therapy was obtained from the patient. They were informed they may decline to participate or withdraw from participation in pharmacy services at any time.         [1] No Known Allergies

## 2025-05-13 ENCOUNTER — APPOINTMENT (OUTPATIENT)
Dept: PHARMACY | Facility: HOSPITAL | Age: 70
End: 2025-05-13
Payer: MEDICARE

## 2025-05-13 DIAGNOSIS — E11.9 TYPE 2 DIABETES MELLITUS WITHOUT COMPLICATION, WITHOUT LONG-TERM CURRENT USE OF INSULIN: ICD-10-CM

## 2025-05-13 DIAGNOSIS — G47.33 OBSTRUCTIVE SLEEP APNEA OF ADULT: ICD-10-CM

## 2025-05-13 DIAGNOSIS — E66.812 OBESITY, CLASS II, BMI 35-39.9: Primary | ICD-10-CM

## 2025-05-13 NOTE — PROGRESS NOTES
"  Clinical Pharmacy Appointment    Patient ID: Alysia Hart is a 69 y.o. male who presents for Diabetes.    Pt is here for Follow Up appointment. Patient was last seen by clinical pharmacist 4/22/25 at which time The patient was started on moujaro 2.5 mg weekly.    Since this visit, the patient reports that he had some vision changes since starting the medication and stopped the medication because of it. The patient reports that he believes this to be caused by the medication but is unsure. The patient took 2 doses of the medication.     Referring Provider: Nuris Long*  PCP: Nuris Long MD   Last visit with PCP: 4/16/25   Next visit with PCP: 10/14/25      Subjective     HPI  DIABETES MELLITUS TYPE 2:    Known diabetic complications: No.  Does patient follow with Endocrinology: No  Last optometry exam: 8 months ago, cataract surgery   Most recent visit in Podiatry: Upcoming in next few weeks-- patient denies sores or cuts on feet today      Current diabetic medications include:  Metformin 1000 mg BID     Clarifications to above regimen: None  Adverse Effects: None     Past diabetic medications include:  None    Glucose Readings:  Fingerstick/Glucometer  Patient tests BG 0 times per day    Current home BG readings:   Fasting: N/A   Post Prandial: N.A  Previous home BG readings:   Fasting: N/A   Post Prandial: N/A    Any episodes of hypoglycemia? N/A.    Did patient treat episode of hypoglycemia appropriately? N/A  Does the patient have a prescription for ready-to-use Glucagon? N/a  Does pt have proteinuria? N/A No results found for: \"MICROALBCREA\"       Lifestyle:  Diet: 2 meals/day. Usually home cooked  BK: Fish and grits, pancakes  LN: usually skips, sandwich (turkey)   DN: pork chops, potatoes, baked beans   Snacks: brownies, sweets, cookies  Drinks: water, milk, iced tea/pop rare  Physical Activity: None, might walk every other day     Secondary Prevention:  Statin? No  ACE-I/ARB? " No  Aspirin? No    Pertinent PMH Review:  PMH of Pancreatitis: No  PMH of Retinopathy: No  PMH of Urinary Tract Infections: No  PMH of MTC: No       Drug Interactions  No relevant drug interactions were noted.    Medication System Management  Patient's preferred pharmacy: [facility]  Affordability/Accessibility: none at this time      Objective   Allergies[1]  Social History     Social History Narrative    Not on file      Medication Review  Current Outpatient Medications   Medication Instructions    acetaminophen (TYLENOL) 650 mg, Every 6 hours    albuterol (Ventolin HFA) 90 mcg/actuation inhaler 2 puffs, inhalation, Every 4 hours PRN    albuterol 5 mg/mL nebulizer solution Mix 0.25 mL (1.25 mg) into 3 mL of saline and administer via nebulizer every 4 hours as needed for cough or wheezing.    aspirin 81 mg, oral, Daily    azelastine (Optivar) 0.05 % ophthalmic solution 1 drop, 2 times daily PRN    cholecalciferol (Vitamin D-3) 25 MCG (1000 UT) tablet 1 tablet, Daily    cholecalciferol (VITAMIN D-3) 2,000 Units, Daily RT    clotrimazole (Lotrimin) 1 % cream 2 times daily    cyclopentolate (Cyclogyl) 1 % ophthalmic solution 1 drop, 2 times daily    ergocalciferol (VITAMIN D-2) 50,000 Units, Once Weekly    lancets 33 gauge misc Use everyday to test sugar.    meloxicam (MOBIC) 7.5 mg, oral, Daily    metFORMIN (GLUCOPHAGE) 1,000 mg, oral, 2 times daily (morning and late afternoon)    mometasone (Asmanex Twisthaler) 220 mcg/ actuation (60) inhaler 2 puffs, inhalation, Daily RT    Mounjaro 2.5 mg, subcutaneous, Weekly    OneTouch Ultra Test strip Use to test sugar.    sildenafil (VIAGRA) 100 mg, oral, Daily PRN    triamterene-hydrochlorothiazid (Maxzide-25) 37.5-25 mg tablet 1 tablet, oral, Daily      Vitals  BP Readings from Last 2 Encounters:   04/16/25 132/90   02/03/25 139/79     BMI Readings from Last 1 Encounters:   04/16/25 38.64 kg/m²      Labs  A1C  Lab Results   Component Value Date    HGBA1C 6.2  "12/14/2022    HGBA1C 6.4 09/07/2022    HGBA1C 6.1 (A) 03/09/2022     BMP  Lab Results   Component Value Date    CALCIUM 9.2 05/21/2024     05/21/2024    K 4.4 05/21/2024    CO2 26 05/21/2024     05/21/2024    BUN 12 05/21/2024    CREATININE 0.83 05/21/2024    EGFR >90 05/21/2024     LFTs  Lab Results   Component Value Date    ALT 16 05/21/2024    AST 20 05/21/2024    ALKPHOS 67 05/21/2024    BILITOT 0.5 05/21/2024     FLP  Lab Results   Component Value Date    TRIG 174 (H) 04/18/2025    CHOL 160 04/18/2025    LDLF 56 03/09/2022    LDLCALC 75 04/18/2025    HDL 58 04/18/2025     Urine Microalbumin  No results found for: \"MICROALBCREA\"  Weight Management  Wt Readings from Last 3 Encounters:   04/16/25 117 kg (257 lb 14.4 oz)   02/03/25 122 kg (269 lb)   01/20/25 122 kg (269 lb)      There is no height or weight on file to calculate BMI.     Assessment/Plan   Problem List Items Addressed This Visit       Obesity, Class II, BMI 35-39.9    Patient's goal A1c is < 7%.  Is pt at goal? N/A Patient is due for updated A1c.   Patient's SMBGs are Unavailable as patient does not check his BG.     Rationale for plan: Patient is overweight and has comorbidity of T2DM. Patient is overdue for A1c and does not check his BG at this time. Patient interested in weight loss as well as DM control.     Patient may have had blurry vision from mounjaro but would like to trial it again.     Medication Changes:  CONTINUE  Metformin 1000 mg BID   RESTART  Mounjaro 2.5 mg weekly    Future Considerations:  Patient educated on BG checking via telehealth.  May benefit from in person education at PCP office.     Monitoring and Education:   BG targets and A1C goals reviewed with the patient. , MOA of all medications reviewed. , SE of mounjaro discussed. , Hypoglycemia s/s and treatment reviewed. , Medication administration reviewed. , All patient questions were answered and discussed., and Patient expressed understanding.     Patient due " for updated A1c and ACR.   Patient counseled on stopping the medication if vision changes.          Obstructive sleep apnea of adult     Other Visit Diagnoses         Type 2 diabetes mellitus without complication, without long-term current use of insulin                Clinical Pharmacist follow-up: 5/13/25 @ 1:40pm, Telehealth visit    Continue all meds under the continuation of care with the referring provider and clinical pharmacy team.    Thank you,  Jennifer Carranza, PharmD  Clinical Pharmacist  [phone] (Office Phone)   [phone] (Fax)   [email or web address]     Verbal consent to manage patient's drug therapy was obtained from the patient. They were informed they may decline to participate or withdraw from participation in pharmacy services at any time.         [1] No Known Allergies

## 2025-05-13 NOTE — ASSESSMENT & PLAN NOTE
Patient's goal A1c is < 7%.  Is pt at goal? N/A Patient is due for updated A1c.   Patient's SMBGs are Unavailable as patient does not check his BG.     Rationale for plan: Patient is overweight and has comorbidity of T2DM. Patient is overdue for A1c and does not check his BG at this time. Patient interested in weight loss as well as DM control.     Patient may have had blurry vision from mounjaro but would like to trial it again.     Medication Changes:  CONTINUE  Metformin 1000 mg BID   RESTART  Mounjaro 2.5 mg weekly    Future Considerations:  Patient educated on BG checking via telehealth.  May benefit from in person education at PCP office.     Monitoring and Education:   BG targets and A1C goals reviewed with the patient. , MOA of all medications reviewed. , SE of mounjaro discussed. , Hypoglycemia s/s and treatment reviewed. , Medication administration reviewed. , All patient questions were answered and discussed., and Patient expressed understanding.     Patient due for updated A1c and ACR.   Patient counseled on stopping the medication if vision changes.

## 2025-05-27 ENCOUNTER — APPOINTMENT (OUTPATIENT)
Dept: PHARMACY | Facility: HOSPITAL | Age: 70
End: 2025-05-27
Payer: MEDICARE

## 2025-05-27 DIAGNOSIS — F17.210 CIGARETTE SMOKER: Primary | ICD-10-CM

## 2025-05-27 DIAGNOSIS — E11.9 TYPE 2 DIABETES MELLITUS WITHOUT COMPLICATION, WITHOUT LONG-TERM CURRENT USE OF INSULIN: ICD-10-CM

## 2025-05-27 DIAGNOSIS — E66.812 OBESITY, CLASS II, BMI 35-39.9: ICD-10-CM

## 2025-05-27 NOTE — ASSESSMENT & PLAN NOTE
Patient's goal A1c is < 7%.  Is pt at goal? N/A Patient is due for updated A1c.   Patient's SMBGs are Unavailable as patient does not check his BG.     Rationale for plan: Patient is overweight and has comorbidity of T2DM. Patient is overdue for A1c and does not check his BG at this time. Patient interested in weight loss as well as DM control.     Patient reports blurred vision with restart of mounjaro. Patient would like referral to weight management and nutrition/dietitian.     Medication Changes:  CONTINUE  Metformin 1000 mg BID   STOP  Mounjaro 2.5 mg weekly    Future Considerations:  Patient educated on BG checking via telehealth.  May benefit from in person education at PCP office.     Monitoring and Education:   BG targets and A1C goals reviewed with the patient. , MOA of all medications reviewed. , SE of mounjaro discussed. , Hypoglycemia s/s and treatment reviewed. , Medication administration reviewed. , All patient questions were answered and discussed., and Patient expressed understanding.     Patient due for updated A1c and ACR.   Patient counseled on stopping the medication if vision changes.

## 2025-05-27 NOTE — PROGRESS NOTES
"  Clinical Pharmacy Appointment    Patient ID: Alysia Hart is a 69 y.o. male who presents for Diabetes.    Pt is here for Follow Up appointment. Patient was last seen by clinical pharmacist 5/13/25 at which time The patient was re-started on mounjaro 2.5 mg weekly.    Since this visit, the patient reports that he had some vision changes since starting the medication and stopped the medication because of it. The patient reports that he believes this to be caused by the medication but is unsure. The patient took 2 doses of the medication.     The patient restarted the medication and experienced the same blurred vision symptoms as before. The patient. The patient reports that he is interested in a referral to weight management/nutrition and dietitian for weight loss.    Referring Provider: Nuris Long*  PCP: Nuris Long MD   Last visit with PCP: 4/16/25   Next visit with PCP: 10/14/25      Subjective     HPI  DIABETES MELLITUS TYPE 2:    Known diabetic complications: No.  Does patient follow with Endocrinology: No  Last optometry exam: 8 months ago, cataract surgery   Most recent visit in Podiatry: Upcoming in next few weeks-- patient denies sores or cuts on feet today      Current diabetic medications include:  Metformin 1000 mg BID     Clarifications to above regimen: None  Adverse Effects: None     Past diabetic medications include:  None    Glucose Readings:  Fingerstick/Glucometer  Patient tests BG 0 times per day    Current home BG readings:   Fasting: N/A   Post Prandial: N.A  Previous home BG readings:   Fasting: N/A   Post Prandial: N/A    Any episodes of hypoglycemia? N/A.    Did patient treat episode of hypoglycemia appropriately? N/A  Does the patient have a prescription for ready-to-use Glucagon? N/a  Does pt have proteinuria? N/A No results found for: \"MICROALBCREA\"       Lifestyle:  Diet: 2 meals/day. Usually home cooked  BK: Fish and grits, pancakes  LN: usually skips, sandwich " (turkey)   DN: pork chops, potatoes, baked beans   Snacks: brownies, sweets, cookies  Drinks: water, milk, iced tea/pop rare  Physical Activity: None, might walk every other day     Secondary Prevention:  Statin? No  ACE-I/ARB? No  Aspirin? No    Pertinent PMH Review:  PMH of Pancreatitis: No  PMH of Retinopathy: No  PMH of Urinary Tract Infections: No  PMH of MTC: No       Drug Interactions  No relevant drug interactions were noted.    Medication System Management  Patient's preferred pharmacy: [facility]  Affordability/Accessibility: none at this time      Objective   Allergies[1]  Social History     Social History Narrative    Not on file      Medication Review  Current Outpatient Medications   Medication Instructions    acetaminophen (TYLENOL) 650 mg, Every 6 hours    albuterol (Ventolin HFA) 90 mcg/actuation inhaler 2 puffs, inhalation, Every 4 hours PRN    albuterol 5 mg/mL nebulizer solution Mix 0.25 mL (1.25 mg) into 3 mL of saline and administer via nebulizer every 4 hours as needed for cough or wheezing.    aspirin 81 mg, oral, Daily    azelastine (Optivar) 0.05 % ophthalmic solution 1 drop, 2 times daily PRN    cholecalciferol (Vitamin D-3) 25 MCG (1000 UT) tablet 1 tablet, Daily    cholecalciferol (VITAMIN D-3) 2,000 Units, Daily RT    clotrimazole (Lotrimin) 1 % cream 2 times daily    cyclopentolate (Cyclogyl) 1 % ophthalmic solution 1 drop, 2 times daily    ergocalciferol (VITAMIN D-2) 50,000 Units, Once Weekly    lancets 33 gauge misc Use everyday to test sugar.    meloxicam (MOBIC) 7.5 mg, oral, Daily    metFORMIN (GLUCOPHAGE) 1,000 mg, oral, 2 times daily (morning and late afternoon)    mometasone (Asmanex Twisthaler) 220 mcg/ actuation (60) inhaler 2 puffs, inhalation, Daily RT    OneTouch Ultra Test strip Use to test sugar.    sildenafil (VIAGRA) 100 mg, oral, Daily PRN    triamterene-hydrochlorothiazid (Maxzide-25) 37.5-25 mg tablet 1 tablet, oral, Daily      Vitals  BP Readings  "from Last 2 Encounters:   04/16/25 132/90   02/03/25 139/79     BMI Readings from Last 1 Encounters:   04/16/25 38.64 kg/m²      Labs  A1C  Lab Results   Component Value Date    HGBA1C 6.2 12/14/2022    HGBA1C 6.4 09/07/2022    HGBA1C 6.1 (A) 03/09/2022     BMP  Lab Results   Component Value Date    CALCIUM 9.2 05/21/2024     05/21/2024    K 4.4 05/21/2024    CO2 26 05/21/2024     05/21/2024    BUN 12 05/21/2024    CREATININE 0.83 05/21/2024    EGFR >90 05/21/2024     LFTs  Lab Results   Component Value Date    ALT 16 05/21/2024    AST 20 05/21/2024    ALKPHOS 67 05/21/2024    BILITOT 0.5 05/21/2024     FLP  Lab Results   Component Value Date    TRIG 174 (H) 04/18/2025    CHOL 160 04/18/2025    LDLF 56 03/09/2022    LDLCALC 75 04/18/2025    HDL 58 04/18/2025     Urine Microalbumin  No results found for: \"MICROALBCREA\"  Weight Management  Wt Readings from Last 3 Encounters:   04/16/25 117 kg (257 lb 14.4 oz)   02/03/25 122 kg (269 lb)   01/20/25 122 kg (269 lb)      There is no height or weight on file to calculate BMI.     Assessment/Plan   Problem List Items Addressed This Visit       Obesity, Class II, BMI 35-39.9    Patient's goal A1c is < 7%.  Is pt at goal? N/A Patient is due for updated A1c.   Patient's SMBGs are Unavailable as patient does not check his BG.     Rationale for plan: Patient is overweight and has comorbidity of T2DM. Patient is overdue for A1c and does not check his BG at this time. Patient interested in weight loss as well as DM control.     Patient reports blurred vision with restart of mounjaro. Patient would like referral to weight management and nutrition/dietitian.     Medication Changes:  CONTINUE  Metformin 1000 mg BID   STOP  Mounjaro 2.5 mg weekly    Future Considerations:  Patient educated on BG checking via telehealth.  May benefit from in person education at PCP office.     Monitoring and Education:   BG targets and A1C goals reviewed with the patient. , MOA of all " medications reviewed. , SE of mounjaro discussed. , Hypoglycemia s/s and treatment reviewed. , Medication administration reviewed. , All patient questions were answered and discussed., and Patient expressed understanding.     Patient due for updated A1c and ACR.   Patient counseled on stopping the medication if vision changes.           Other Visit Diagnoses         Type 2 diabetes mellitus without complication, without long-term current use of insulin                  Clinical Pharmacist follow-up: as needed, Telehealth visit    Continue all meds under the continuation of care with the referring provider and clinical pharmacy team.    Thank you,  Jennifer Carranza, PharmD  Clinical Pharmacist  [phone] (Office Phone)   [phone] (Fax)   [email or web address]     Verbal consent to manage patient's drug therapy was obtained from the patient. They were informed they may decline to participate or withdraw from participation in pharmacy services at any time.         [1]   Allergies  Allergen Reactions    Mounjaro [Tirzepatide] Blurred vision      no

## 2025-06-04 ENCOUNTER — APPOINTMENT (OUTPATIENT)
Dept: RADIOLOGY | Facility: HOSPITAL | Age: 70
End: 2025-06-04
Payer: MEDICARE

## 2025-06-04 ENCOUNTER — HOSPITAL ENCOUNTER (EMERGENCY)
Facility: HOSPITAL | Age: 70
Discharge: HOME | End: 2025-06-04
Attending: STUDENT IN AN ORGANIZED HEALTH CARE EDUCATION/TRAINING PROGRAM
Payer: MEDICARE

## 2025-06-04 VITALS
DIASTOLIC BLOOD PRESSURE: 90 MMHG | OXYGEN SATURATION: 95 % | WEIGHT: 257 LBS | BODY MASS INDEX: 38.95 KG/M2 | SYSTOLIC BLOOD PRESSURE: 140 MMHG | TEMPERATURE: 97.6 F | HEART RATE: 85 BPM | HEIGHT: 68 IN | RESPIRATION RATE: 16 BRPM

## 2025-06-04 DIAGNOSIS — J18.9 PNEUMONIA DUE TO INFECTIOUS ORGANISM, UNSPECIFIED LATERALITY, UNSPECIFIED PART OF LUNG: ICD-10-CM

## 2025-06-04 DIAGNOSIS — S91.331A PUNCTURE WOUND OF RIGHT FOOT, INITIAL ENCOUNTER: Primary | ICD-10-CM

## 2025-06-04 LAB
ABO GROUP (TYPE) IN BLOOD: NORMAL
ALBUMIN SERPL BCP-MCNC: 4.3 G/DL (ref 3.4–5)
ALP SERPL-CCNC: 61 U/L (ref 33–136)
ALT SERPL W P-5'-P-CCNC: 14 U/L (ref 10–52)
ANION GAP BLDV CALCULATED.4IONS-SCNC: 12 MMOL/L (ref 10–25)
ANION GAP BLDV CALCULATED.4IONS-SCNC: 14 MMOL/L (ref 10–25)
ANION GAP SERPL CALC-SCNC: 15 MMOL/L (ref 10–20)
ANTIBODY SCREEN: NORMAL
AST SERPL W P-5'-P-CCNC: 21 U/L (ref 9–39)
BASE EXCESS BLDV CALC-SCNC: -0.4 MMOL/L (ref -2–3)
BASE EXCESS BLDV CALC-SCNC: 0.7 MMOL/L (ref -2–3)
BASOPHILS # BLD AUTO: 0.03 X10*3/UL (ref 0–0.1)
BASOPHILS NFR BLD AUTO: 0.5 %
BILIRUB SERPL-MCNC: 0.4 MG/DL (ref 0–1.2)
BODY TEMPERATURE: 37 DEGREES CELSIUS
BODY TEMPERATURE: 37 DEGREES CELSIUS
BUN SERPL-MCNC: 9 MG/DL (ref 6–23)
CA-I BLDV-SCNC: 1.16 MMOL/L (ref 1.1–1.33)
CA-I BLDV-SCNC: 1.17 MMOL/L (ref 1.1–1.33)
CALCIUM SERPL-MCNC: 9.3 MG/DL (ref 8.6–10.6)
CHLORIDE BLDV-SCNC: 101 MMOL/L (ref 98–107)
CHLORIDE BLDV-SCNC: 101 MMOL/L (ref 98–107)
CHLORIDE SERPL-SCNC: 102 MMOL/L (ref 98–107)
CO2 SERPL-SCNC: 25 MMOL/L (ref 21–32)
CREAT SERPL-MCNC: 0.86 MG/DL (ref 0.5–1.3)
EGFRCR SERPLBLD CKD-EPI 2021: >90 ML/MIN/1.73M*2
EOSINOPHIL # BLD AUTO: 0.1 X10*3/UL (ref 0–0.7)
EOSINOPHIL NFR BLD AUTO: 1.7 %
ERYTHROCYTE [DISTWIDTH] IN BLOOD BY AUTOMATED COUNT: 13.2 % (ref 11.5–14.5)
ERYTHROCYTE [DISTWIDTH] IN BLOOD BY AUTOMATED COUNT: 14.7 % (ref 11.5–14.5)
ETHANOL SERPL-MCNC: 123 MG/DL
GLUCOSE BLDV-MCNC: 103 MG/DL (ref 74–99)
GLUCOSE BLDV-MCNC: 125 MG/DL (ref 74–99)
GLUCOSE SERPL-MCNC: 120 MG/DL (ref 74–99)
HCO3 BLDV-SCNC: 25.4 MMOL/L (ref 22–26)
HCO3 BLDV-SCNC: 27.1 MMOL/L (ref 22–26)
HCT VFR BLD AUTO: 35 % (ref 41–52)
HCT VFR BLD AUTO: 38.9 % (ref 41–52)
HCT VFR BLD EST: 42 % (ref 41–52)
HCT VFR BLD EST: 44 % (ref 41–52)
HGB BLD-MCNC: 12.3 G/DL (ref 13.5–17.5)
HGB BLD-MCNC: 13.8 G/DL (ref 13.5–17.5)
HGB BLDV-MCNC: 14 G/DL (ref 13.5–17.5)
HGB BLDV-MCNC: 14.5 G/DL (ref 13.5–17.5)
IMM GRANULOCYTES # BLD AUTO: 0.03 X10*3/UL (ref 0–0.7)
IMM GRANULOCYTES NFR BLD AUTO: 0.5 % (ref 0–0.9)
INR PPP: 1.1 (ref 0.9–1.1)
LACTATE BLDV-SCNC: 2.6 MMOL/L (ref 0.4–2)
LACTATE BLDV-SCNC: 2.7 MMOL/L (ref 0.4–2)
LACTATE BLDV-SCNC: 3.2 MMOL/L (ref 0.4–2)
LACTATE SERPL-SCNC: 5.9 MMOL/L (ref 0.4–2)
LYMPHOCYTES # BLD AUTO: 2.47 X10*3/UL (ref 1.2–4.8)
LYMPHOCYTES NFR BLD AUTO: 41.4 %
MCH RBC QN AUTO: 29.6 PG (ref 26–34)
MCH RBC QN AUTO: 29.9 PG (ref 26–34)
MCHC RBC AUTO-ENTMCNC: 35.1 G/DL (ref 32–36)
MCHC RBC AUTO-ENTMCNC: 35.5 G/DL (ref 32–36)
MCV RBC AUTO: 83 FL (ref 80–100)
MCV RBC AUTO: 85 FL (ref 80–100)
MONOCYTES # BLD AUTO: 0.7 X10*3/UL (ref 0.1–1)
MONOCYTES NFR BLD AUTO: 11.7 %
NEUTROPHILS # BLD AUTO: 2.63 X10*3/UL (ref 1.2–7.7)
NEUTROPHILS NFR BLD AUTO: 44.2 %
NRBC BLD-RTO: 0 /100 WBCS (ref 0–0)
NRBC BLD-RTO: 0 /100 WBCS (ref 0–0)
OXYHGB MFR BLDV: 65.4 % (ref 45–75)
OXYHGB MFR BLDV: 78.1 % (ref 45–75)
PCO2 BLDV: 45 MM HG (ref 41–51)
PCO2 BLDV: 49 MM HG (ref 41–51)
PH BLDV: 7.35 PH (ref 7.33–7.43)
PH BLDV: 7.36 PH (ref 7.33–7.43)
PLATELET # BLD AUTO: 280 X10*3/UL (ref 150–450)
PLATELET # BLD AUTO: 308 X10*3/UL (ref 150–450)
PO2 BLDV: 41 MM HG (ref 35–45)
PO2 BLDV: 49 MM HG (ref 35–45)
POTASSIUM BLDV-SCNC: 4.1 MMOL/L (ref 3.5–5.3)
POTASSIUM BLDV-SCNC: 4.2 MMOL/L (ref 3.5–5.3)
POTASSIUM SERPL-SCNC: 4 MMOL/L (ref 3.5–5.3)
PROT SERPL-MCNC: 7.9 G/DL (ref 6.4–8.2)
PROTHROMBIN TIME: 12.4 SECONDS (ref 9.8–12.4)
RBC # BLD AUTO: 4.12 X10*6/UL (ref 4.5–5.9)
RBC # BLD AUTO: 4.67 X10*6/UL (ref 4.5–5.9)
RH FACTOR (ANTIGEN D): NORMAL
SAO2 % BLDV: 67 % (ref 45–75)
SAO2 % BLDV: 80 % (ref 45–75)
SODIUM BLDV-SCNC: 136 MMOL/L (ref 136–145)
SODIUM BLDV-SCNC: 136 MMOL/L (ref 136–145)
SODIUM SERPL-SCNC: 138 MMOL/L (ref 136–145)
WBC # BLD AUTO: 6 X10*3/UL (ref 4.4–11.3)
WBC # BLD AUTO: 6.3 X10*3/UL (ref 4.4–11.3)

## 2025-06-04 PROCEDURE — 82435 ASSAY OF BLOOD CHLORIDE: CPT

## 2025-06-04 PROCEDURE — 96361 HYDRATE IV INFUSION ADD-ON: CPT

## 2025-06-04 PROCEDURE — 96360 HYDRATION IV INFUSION INIT: CPT

## 2025-06-04 PROCEDURE — 2500000004 HC RX 250 GENERAL PHARMACY W/ HCPCS (ALT 636 FOR OP/ED): Performed by: STUDENT IN AN ORGANIZED HEALTH CARE EDUCATION/TRAINING PROGRAM

## 2025-06-04 PROCEDURE — 36415 COLL VENOUS BLD VENIPUNCTURE: CPT

## 2025-06-04 PROCEDURE — 71045 X-RAY EXAM CHEST 1 VIEW: CPT | Performed by: RADIOLOGY

## 2025-06-04 PROCEDURE — 73630 X-RAY EXAM OF FOOT: CPT | Mod: RT

## 2025-06-04 PROCEDURE — 85025 COMPLETE CBC W/AUTO DIFF WBC: CPT

## 2025-06-04 PROCEDURE — 90715 TDAP VACCINE 7 YRS/> IM: CPT | Performed by: STUDENT IN AN ORGANIZED HEALTH CARE EDUCATION/TRAINING PROGRAM

## 2025-06-04 PROCEDURE — 2500000001 HC RX 250 WO HCPCS SELF ADMINISTERED DRUGS (ALT 637 FOR MEDICARE OP): Performed by: STUDENT IN AN ORGANIZED HEALTH CARE EDUCATION/TRAINING PROGRAM

## 2025-06-04 PROCEDURE — G0390 TRAUMA RESPONS W/HOSP CRITI: HCPCS

## 2025-06-04 PROCEDURE — 99285 EMERGENCY DEPT VISIT HI MDM: CPT | Mod: 25 | Performed by: STUDENT IN AN ORGANIZED HEALTH CARE EDUCATION/TRAINING PROGRAM

## 2025-06-04 PROCEDURE — 90471 IMMUNIZATION ADMIN: CPT | Performed by: STUDENT IN AN ORGANIZED HEALTH CARE EDUCATION/TRAINING PROGRAM

## 2025-06-04 PROCEDURE — 2500000001 HC RX 250 WO HCPCS SELF ADMINISTERED DRUGS (ALT 637 FOR MEDICARE OP)

## 2025-06-04 PROCEDURE — 83605 ASSAY OF LACTIC ACID: CPT

## 2025-06-04 PROCEDURE — 85027 COMPLETE CBC AUTOMATED: CPT

## 2025-06-04 PROCEDURE — 84295 ASSAY OF SERUM SODIUM: CPT

## 2025-06-04 PROCEDURE — 2500000004 HC RX 250 GENERAL PHARMACY W/ HCPCS (ALT 636 FOR OP/ED)

## 2025-06-04 PROCEDURE — 71045 X-RAY EXAM CHEST 1 VIEW: CPT

## 2025-06-04 PROCEDURE — 73630 X-RAY EXAM OF FOOT: CPT | Mod: RIGHT SIDE | Performed by: RADIOLOGY

## 2025-06-04 PROCEDURE — 86901 BLOOD TYPING SEROLOGIC RH(D): CPT

## 2025-06-04 PROCEDURE — 85610 PROTHROMBIN TIME: CPT

## 2025-06-04 PROCEDURE — 99222 1ST HOSP IP/OBS MODERATE 55: CPT | Performed by: PHYSICIAN ASSISTANT

## 2025-06-04 PROCEDURE — 82077 ASSAY SPEC XCP UR&BREATH IA: CPT

## 2025-06-04 RX ORDER — AMOXICILLIN AND CLAVULANATE POTASSIUM 875; 125 MG/1; MG/1
1 TABLET, FILM COATED ORAL EVERY 12 HOURS
Qty: 13 TABLET | Refills: 0 | Status: SHIPPED | OUTPATIENT
Start: 2025-06-04 | End: 2025-06-11

## 2025-06-04 RX ORDER — AMOXICILLIN 250 MG/1
1000 CAPSULE ORAL ONCE
Status: DISCONTINUED | OUTPATIENT
Start: 2025-06-04 | End: 2025-06-04

## 2025-06-04 RX ORDER — AMOXICILLIN AND CLAVULANATE POTASSIUM 875; 125 MG/1; MG/1
1 TABLET, FILM COATED ORAL ONCE
Status: COMPLETED | OUTPATIENT
Start: 2025-06-04 | End: 2025-06-04

## 2025-06-04 RX ORDER — AZITHROMYCIN 250 MG/1
250 TABLET, FILM COATED ORAL DAILY
Qty: 4 TABLET | Refills: 0 | Status: SHIPPED | OUTPATIENT
Start: 2025-06-04 | End: 2025-06-08

## 2025-06-04 RX ORDER — AZITHROMYCIN 500 MG/1
500 TABLET, FILM COATED ORAL ONCE
Status: COMPLETED | OUTPATIENT
Start: 2025-06-04 | End: 2025-06-04

## 2025-06-04 RX ORDER — DOXYCYCLINE 100 MG/1
100 CAPSULE ORAL 2 TIMES DAILY
Qty: 10 CAPSULE | Refills: 0 | Status: SHIPPED | OUTPATIENT
Start: 2025-06-04 | End: 2025-06-04 | Stop reason: WASHOUT

## 2025-06-04 RX ORDER — AMOXICILLIN 500 MG/1
1000 CAPSULE ORAL EVERY 8 HOURS SCHEDULED
Qty: 30 CAPSULE | Refills: 0 | Status: SHIPPED | OUTPATIENT
Start: 2025-06-04 | End: 2025-06-04 | Stop reason: WASHOUT

## 2025-06-04 RX ORDER — DOXYCYCLINE HYCLATE 100 MG
100 TABLET ORAL ONCE
Status: DISCONTINUED | OUTPATIENT
Start: 2025-06-04 | End: 2025-06-04

## 2025-06-04 RX ORDER — AZITHROMYCIN 250 MG/1
250 TABLET, FILM COATED ORAL DAILY
Qty: 4 TABLET | Refills: 0 | Status: SHIPPED | OUTPATIENT
Start: 2025-06-05 | End: 2025-06-04

## 2025-06-04 RX ORDER — ACETAMINOPHEN 500 MG
1000 TABLET ORAL EVERY 8 HOURS PRN
Qty: 30 TABLET | Refills: 0 | Status: SHIPPED | OUTPATIENT
Start: 2025-06-04 | End: 2025-06-14

## 2025-06-04 RX ORDER — AMOXICILLIN AND CLAVULANATE POTASSIUM 875; 125 MG/1; MG/1
1 TABLET, FILM COATED ORAL EVERY 12 HOURS
Qty: 14 TABLET | Refills: 0 | Status: SHIPPED | OUTPATIENT
Start: 2025-06-04 | End: 2025-06-04

## 2025-06-04 RX ADMIN — AMOXICILLIN AND CLAVULANATE POTASSIUM 1 TABLET: 875; 125 TABLET, FILM COATED ORAL at 06:22

## 2025-06-04 RX ADMIN — AZITHROMYCIN DIHYDRATE 500 MG: 500 TABLET ORAL at 06:22

## 2025-06-04 RX ADMIN — TETANUS TOXOID, REDUCED DIPHTHERIA TOXOID AND ACELLULAR PERTUSSIS VACCINE, ADSORBED 0.5 ML: 5; 2.5; 8; 8; 2.5 SUSPENSION INTRAMUSCULAR at 01:47

## 2025-06-04 RX ADMIN — SODIUM CHLORIDE, POTASSIUM CHLORIDE, SODIUM LACTATE AND CALCIUM CHLORIDE 1000 ML: 600; 310; 30; 20 INJECTION, SOLUTION INTRAVENOUS at 04:17

## 2025-06-04 ASSESSMENT — ENCOUNTER SYMPTOMS
NAUSEA: 0
DIZZINESS: 0
NECK STIFFNESS: 0
PALPITATIONS: 0
LIGHT-HEADEDNESS: 0
SINUS PAIN: 0
DIFFICULTY URINATING: 0
CHEST TIGHTNESS: 0
SHORTNESS OF BREATH: 0
FATIGUE: 0
ABDOMINAL PAIN: 0
SINUS PRESSURE: 0
CHILLS: 0
HEADACHES: 0
BACK PAIN: 0
NECK PAIN: 0
VOMITING: 0
WEAKNESS: 0

## 2025-06-04 ASSESSMENT — COLUMBIA-SUICIDE SEVERITY RATING SCALE - C-SSRS
6. HAVE YOU EVER DONE ANYTHING, STARTED TO DO ANYTHING, OR PREPARED TO DO ANYTHING TO END YOUR LIFE?: NO
2. HAVE YOU ACTUALLY HAD ANY THOUGHTS OF KILLING YOURSELF?: NO
1. IN THE PAST MONTH, HAVE YOU WISHED YOU WERE DEAD OR WISHED YOU COULD GO TO SLEEP AND NOT WAKE UP?: NO

## 2025-06-04 ASSESSMENT — PAIN DESCRIPTION - ORIENTATION: ORIENTATION: RIGHT

## 2025-06-04 ASSESSMENT — PAIN SCALES - GENERAL
PAINLEVEL_OUTOF10: 2
PAINLEVEL_OUTOF10: 2

## 2025-06-04 ASSESSMENT — PAIN - FUNCTIONAL ASSESSMENT: PAIN_FUNCTIONAL_ASSESSMENT: 0-10

## 2025-06-04 ASSESSMENT — PAIN DESCRIPTION - LOCATION: LOCATION: FOOT

## 2025-06-04 NOTE — ED TRIAGE NOTES
Full trauma activation, BIB EMS from home puncture wound to R foot, per EMS wound was squirting blood, tourniquet placed 0001 by EMS, pt intoxicated unsure of event, states he was in the shower

## 2025-06-04 NOTE — H&P
[facility]  TRAUMA SERVICE - HISTORY AND PHYSICAL / CONSULT    Patient Name: Jen Trauma Uniform  MRN: [id number]  Admit Date: 604  : 1959  AGE: 66 y.o.   GENDER: male  ==============================================================================  MECHANISM OF INJURY / CHIEF COMPLAINT:   66 year old male that presents as a Full Trauma after a puncture wound to his right foot.  Patient was in the shower earlier today and noticed that his foot was covered in blood. Patient was placed in a tourniquet her EMS. Patient denies fall, head strike or LOC. Upon arriving the patient is hemodynamically stable and tourniquet was removed and bleeding was controlled. Minor wound present on the dorsum of his right foot.     LOC (yes/no?): Denies  Anticoagulant / Anti-platelet Rx? (for what dx?): aspirin 81 mg  Referring Facility Name (N/A for scene EMR run): N/A    INJURIES:   1 cm wound to the dorsum of his right foot    OTHER MEDICAL PROBLEMS:  T2DM  COPD  CAD  Neuropathy  HTN    INCIDENTAL FINDINGS:  Foreign body present at the plantar aspect of the foot     ==============================================================================  PLAN OF CARE:  ## Wound to the dorsum of the right foot   - Xray R foot --> No acute traumatic injuries   - Wound was washed out with NS-Beta   - Wound was covered with 2x2 gauze   - CXR --> No acute traumatic injuries     No further trauma work-up or management indicated at this time. Thank you for allowing us to participate in the care of this patient. If a re-consult is required, please don't hesitate to call. Trauma will sign off at this time. Disposition per primary team/ED.    Patient sen and discussed with Dr. El.     Total face to face time spent with patient/family of 27 minutes, with >50% of the time spent discussing plan of care/management, counseling/educating on disease processes, explaining results of diagnostic  testing.     Rhina Anguiano PA-C  Trauma, Critical Care, Acute Care Surgery   Floor: [id number]  TSICU: [id number]  ==============================================================================  PAST MEDICAL HISTORY:   PMH: T2D, COPD, CAD, HTN, neuropathy  Medical History[1]  Last menstrual period: N/A    PSH: Unknown  Surgical History[2]  FH: Unknown  Family History[3]  SOCIAL HISTORY:    Smoking: History of smoking. Quit 5 years ago. Prior pack a day smoker for 20 years.  Tobacco Use History[4]    Alcohol: Drinks every 4 days.  Drinking 5 beers when he does drink.   Social History     Substance and Sexual Activity   Alcohol Use Not on file       Drug use: Ocasional marijuana use     MEDICATIONS: Metformin, vitamin D, Aspirin 81mg, Gabapentin  Prior to Admission medications    Not on File     ALLERGIES: Denies  RX Allergies[5]    REVIEW OF SYSTEMS:  Review of Systems   Constitutional:  Negative for chills and fatigue.   HENT:  Negative for sinus pressure and sinus pain.    Respiratory:  Negative for chest tightness and shortness of breath.    Cardiovascular:  Negative for chest pain and palpitations.   Gastrointestinal:  Negative for abdominal pain, nausea and vomiting.   Genitourinary:  Negative for difficulty urinating.   Musculoskeletal:  Negative for back pain, neck pain and neck stiffness.   Neurological:  Negative for dizziness, syncope, weakness, light-headedness and headaches.     PHYSICAL EXAM:  PRIMARY SURVEY:  Airway  Airway is patent.     Breathing  Breathing is normal. Right breath sounds are normal. Left breath sounds are normal.     Circulation  Cardiac rhythm is regular. Rate is regular.   Pulses  Radial: 2+ on the right; 2+ on the left.  Femoral: 2+ on the right; 2+ on the left.  Pedal: 2+ on the right; 2+ on the left.    Disability  Bantry Coma Score  Eye:4   Verbal:5   Motor:6      15  Pupils  Right Pupil:   round and reactive      4 mm  Left Pupil:   round and reactive      4 mm     Motor Strength    strength:  5/5 on the right  5/5 on the left  Dorsiflex strength:  5/5 on the right  5/5 on the left  Plantarflex strength:  5/5 on the right  5/5 on the left  The patient does not have a sensory deficit.       SECONDARY SURVEY/PHYSICAL EXAM:  Secondary Survey:  NEURO: A&O x3, GCS 15, CN II-XII intact, MOELLER equally, muscle strength 5/5, no sensory deficits  HEAD: NC/AT, No lacerations or abrasions, no bony step offs, midface stable.  EENT: PERRL, EOMI. External ear without laceration. Nasal septum midline. Oral mucosa and tongue without lacerations.  NECK: No cervical spine tenderness or step offs, no lacerations or abrasions, trachea midline. No JVD.  RESPIRATORY/CHEST: No abrasions, contusions, crepitus or tenderness to palpation. Non-labored, equal chest expansion, CTAB.  CV: RRR. Pulses bilateral: 2+ radial, 2+DP 2+femoral. No TTP of chest  ABDOMEN: soft, nontender, nondistended. No scars, abrasions or lacerations.  PELVIS: Stable to compression.  : nml external genitalia, no blood at urethral meatus  BACK/SPINE: No thoracic midline tenderness, step-offs or deformities. No lumbar midline tenderness, step-offs, or deformities.  No abrasions, hematomas or lacerations noted.  EXTREMITIES: Nml ROM w/o pain. Edema present in both legs. 1 cm wound on the dorum of right foot.     IMAGING SUMMARY:    XR R foot: No acute osseous injuries.  Radiopaque foreign body in the plantar soft tissue.   CXR: No acute traumatic injuries    LABS:  Results from last 7 days   Lab Units 06/04/25  0144   WBC AUTO x10*3/uL 6.0   HEMOGLOBIN g/dL 13.8   HEMATOCRIT % 38.9*   PLATELETS AUTO x10*3/uL 280   NEUTROS PCT AUTO % 44.2   LYMPHS PCT AUTO % 41.4   MONOS PCT AUTO % 11.7   EOS PCT AUTO % 1.7     Results from last 7 days   Lab Units 06/04/25  0144   INR  1.1     Results from last 7 days   Lab Units 06/04/25  0144   SODIUM mmol/L 138   POTASSIUM mmol/L 4.0   CHLORIDE mmol/L 102   CO2 mmol/L 25   BUN mg/dL 9   CREATININE mg/dL 0.86    CALCIUM mg/dL 9.3   PROTEIN TOTAL g/dL 7.9   BILIRUBIN TOTAL mg/dL 0.4   ALK PHOS U/L 61   ALT U/L 14   AST U/L 21   GLUCOSE mg/dL 120*     Results from last 7 days   Lab Units 06/04/25  0144   BILIRUBIN TOTAL mg/dL 0.4           I have reviewed all laboratory and imaging results ordered/pertinent for this encounter.           [1] No past medical history on file.  [2] No past surgical history on file.  [3] No family history on file.  [4]   Social History  Tobacco Use   Smoking Status Not on file   Smokeless Tobacco Not on file   [5] No Known Allergies

## 2025-06-04 NOTE — PROGRESS NOTES
Emergency Medicine Transition of Care Note.    I received Alysia Hart in signout from Dr. Bravo.  Please see the previous ED provider note for all HPI, PE and MDM up to the time of signout at 0700. This is in addition to the primary record.    In brief Alysia Hart is an 69 y.o. male presenting for   Chief Complaint   Patient presents with    Trauma     At the time of signout we were awaiting: straight stick for repeat venous blood gas, reassessment of elevated lactate. Repeat lactate downtrends to 2.6 after 1L IVF. Patient ambulatory around the department prior to signout, no need for assistive devices, per his usual baseline. Being treated empirically for community acquired pneumonia based on +CXR, given oral azithromycin and augmentin for first time doses overnight.  [email or web address]. Prescriptions for augmentin and azithromycin for CAP sent - Take all antibiotics, as prescribed, until completely gone - even if you are feeling better. Also Rx for tylenol as needed for foot pain. Please call [phone] for Primary Care referral for follow up appointments.  Staffed with supervising physician, Stephanie Vilchis MD, who agrees with workup and treatment plan.    Diagnostics     Labs Reviewed   BLOOD GAS LACTIC ACID, VENOUS - Abnormal       Result Value    POCT Lactate, Venous 3.2 (*)    CBC WITH AUTO DIFFERENTIAL - Abnormal    WBC 6.0      nRBC 0.0      RBC 4.67      Hemoglobin 13.8      Hematocrit 38.9 (*)     MCV 83      MCH 29.6      MCHC 35.5      RDW 13.2      Platelets 280      Neutrophils % 44.2      Immature Granulocytes %, Automated 0.5      Lymphocytes % 41.4      Monocytes % 11.7      Eosinophils % 1.7      Basophils % 0.5      Neutrophils Absolute 2.63      Immature Granulocytes Absolute, Automated 0.03      Lymphocytes Absolute 2.47      Monocytes Absolute 0.70      Eosinophils Absolute 0.10      Basophils Absolute 0.03     COMPREHENSIVE METABOLIC  PANEL - Abnormal    Glucose 120 (*)     Sodium 138      Potassium 4.0      Chloride 102      Bicarbonate 25      Anion Gap 15      Urea Nitrogen 9      Creatinine 0.86      eGFR >90      Calcium 9.3      Albumin 4.3      Alkaline Phosphatase 61      Total Protein 7.9      AST 21      Bilirubin, Total 0.4      ALT 14     ALCOHOL - Abnormal    Alcohol 123 (*)    CBC - Abnormal    WBC 6.3      nRBC 0.0      RBC 4.12 (*)     Hemoglobin 12.3 (*)     Hematocrit 35.0 (*)     MCV 85      MCH 29.9      MCHC 35.1      RDW 14.7 (*)     Platelets 308     LACTATE - Abnormal    Lactate 5.9 (*)     Narrative:     Venipuncture immediately after or during the administration of Metamizole may lead to falsely low results. Testing should be performed immediately prior to Metamizole dosing.   BLOOD GAS VENOUS FULL PANEL UNSOLICITED - Abnormal    POCT pH, Venous 7.35      POCT pCO2, Venous 49      POCT pO2, Venous 41      POCT SO2, Venous 67      POCT Oxy Hemoglobin, Venous 65.4      POCT Hematocrit Calculated, Venous 44.0      POCT Sodium, Venous 136      POCT Potassium, Venous 4.2      POCT Chloride, Venous 101      POCT Ionized Calicum, Venous 1.17      POCT Glucose, Venous 125 (*)     POCT Lactate, Venous 2.7 (*)     POCT Base Excess, Venous 0.7      POCT HCO3 Calculated, Venous 27.1 (*)     POCT Hemoglobin, Venous 14.5      POCT Anion Gap, Venous 12.0      Patient Temperature 37.0     BLOOD GAS VENOUS FULL PANEL UNSOLICITED - Abnormal    POCT pH, Venous 7.36      POCT pCO2, Venous 45      POCT pO2, Venous 49 (*)     POCT SO2, Venous 80 (*)     POCT Oxy Hemoglobin, Venous 78.1 (*)     POCT Hematocrit Calculated, Venous 42.0      POCT Sodium, Venous 136      POCT Potassium, Venous 4.1      POCT Chloride, Venous 101      POCT Ionized Calicum, Venous 1.16      POCT Glucose, Venous 103 (*)     POCT Lactate, Venous 2.6 (*)     POCT Base Excess, Venous -0.4      POCT HCO3 Calculated, Venous 25.4      POCT Hemoglobin, Venous 14.0      POCT  "Anion Gap, Venous 14.0      Patient Temperature 37.0     PROTIME-INR - Normal    Protime 12.4      INR 1.1     TYPE AND SCREEN    ABO TYPE O      Rh TYPE POS      ANTIBODY SCREEN NEG     LACTATE   LACTATE   LACTATE   BLOOD GAS VENOUS FULL PANEL   BLOOD GAS LACTIC ACID, VENOUS       XR chest 1 view   Final Result   1.  Retrocardiac airspace opacity which may represent atelectasis or   pneumonia in the appropriate clinical setting.        I personally reviewed the images/study and I agree with the findings   as stated by resident Ramana Ferro. This study was interpreted   at [place], [place].        MACRO:   None        Signed by: Evan Finkelstein 6/4/2025 4:37 AM   Dictation workstation:   [id number]      XR foot right 3+ views   Final Result   No acute osseous abnormality.   Curvilinear radiopaque foreign body in the plantar soft tissues at   the level of the calcaneus Swelling along the dorsum of the midfoot.        MACRO:   None.        Signed by: Evan Finkelstein 6/4/2025 2:42 AM   Dictation workstation:   [id number]            ED Course as of 06/04/25 0804   Wed Jun 04, 2025   0802 Lactate repeated and 2.9. Unclear if 5.9 was error. Reassessed patient, says he \"feels great\" and is ready to go. I re-examined foot. Hemostatic. Dopplered DP and PT with triphasic signal. Walking around ED. Okay with dc [CALI]      ED Course User Index  [CALI] Stephanie Vilchis MD         Diagnoses as of 06/04/25 0804   Pneumonia due to infectious organism, unspecified laterality, unspecified part of lung   Puncture wound of right foot, initial encounter       Final diagnoses:   [J18.9] Pneumonia due to infectious organism, unspecified laterality, unspecified part of lung   [S91.331A] Puncture wound of right foot, initial encounter         Keerthi Alex PA-C   "

## 2025-06-04 NOTE — PROGRESS NOTES
Full: Puncture to foot  Name: Tea Hart  1955      Pt is a 69 year old male presenting to the ED with a puncture injury to the right foot. Per pt, he was in the shower earlier this evening (around 10 pm) when he noticed his foot bleeding. Pt states that it would not stop bleeding so he called EMS. Pt is unsure of what happened to his foot, denies any known injuries. Pt has a small puncture to the top of his foot. Pt declined for family to be contacted.     Plan: pending    SEGUNDO Hicks     Secure Chat  [phone]

## 2025-06-04 NOTE — DISCHARGE INSTRUCTIONS
Take all antibiotics, as prescribed, until completely gone - even if you are feeling better.  Please call [phone] for Primary Care referral for follow up appointments.

## 2025-06-04 NOTE — ED PROVIDER NOTES
History of Present Illness   Information Gathering: History collected from patient    H[facility]  [facility] 66 [facility] presenting to the emergency department as a full trauma for a right foot puncture wound.  Per EMS, patient called 911 for bleeding from his foot that he knows when in the shower.  Patient is unaware of dropping anything on the splint or how this occurred.  Stated that he had a few beers prior to this.  Denies fall.  Per EMS, patient appeared to have arterial bleeding from the foot when they arrived and tourniquet was placed at midnight.  Patient subsequently transported to the hospital.  Patient denies any pain outside of his foot.  Denies allergies.  Denies chest pain headache abdominal pain.    Physical Exam   Triage vitals:  T 36.8 °C (98.3 °F)  HR 96  /90  RR (!) 23  O2 (!) 93 %      Physical Exam  Constitutional:       Appearance: Normal appearance.   HENT:      Head: Normocephalic and atraumatic.   Eyes:      Extraocular Movements: Extraocular movements intact.      Pupils: Pupils are equal, round, and reactive to light.   Cardiovascular:      Rate and Rhythm: Normal rate and regular rhythm.   Pulmonary:      Effort: Pulmonary effort is normal.      Breath sounds: Normal breath sounds.   Abdominal:      General: Abdomen is flat.      Palpations: Abdomen is soft.   Musculoskeletal:         General: No swelling or signs of injury. Normal range of motion.   Skin:     Comments: Palpable DP pulses bilaterally confirmed with Doppler.  There is a puncture wound to the dorsal aspect of the right foot.  Tourniquet taken down and wound hemostatic.  Remainder of primary and secondary exam without traumatic skin findings   Neurological:      General: No focal deficit present.      Mental Status: He is alert and oriented to person, place, and time.      Comments: Patient with 5 out of 5 strength in dorsiflexion and plantarflexion.  Normal sensation throughout the foot.   Patient is awake and alert. Speech is clear. Face is symmetric without facial droop and facial sensation to light touch equal bilaterally. 5/5 motor strength of UEs and LEs. Sensation to light touch intact in all four extremities.         Medical Decision Making & ED Course   Medical Decision Makin y.o. male presenting to the emergency department as a full trauma for a right foot puncture wound.  On arrival, patient is hemodynamically stable and with tourniquet taken down, foot is hemostatic.  There are varicose veins over the feet and suspect venous bleed.  There are dopplerable DP pulses distal to puncture wound.  Wound does not appear to be deep.  Trauma surgery consulted.  As part of the workup, patient obtained CBC CMP lactate and VBG.  Boostrix updated.  Initial lactate mildly elevated at 3.2 in the setting of significant alcohol use.  Trauma surgery washed out patient's wound at bedside and applied dressing prior to signing off.  In addition, patient mildly hypoxic on arrival with intermittent cough.  States that he has not felt feverish or with chills/myalgias.  Chest x-ray demonstrates a retrocardiac opacity suspected to represent community-acquired pneumonia.  As result, patient given azithromycin and Augmentin here in the emergency department.  In addition, patient received 1 L LR with plan to recheck lactate.  During this time, patient ambulated throughout the department with 2 laps around the unit with lowest oxygen saturation reaching 95% without supplemental oxygen.  Patient reports feeling well without chest pain shortness of breath or abdominal pain.  On repeat lactate, this value unexpectedly molina to 5.9.  Again, patient without significant infectious symptoms hemodynamic instability or blood loss that would explain lactate elevation and this was felt to be erroneous however will need to recheck.  On signout to oncoming team, plan is to obtain straight stick of lactate to monitor his trend.   If lactate continues to uptrend, would consider advanced imaging for further workup.    ED Course:  Diagnoses as of 06/04/25 0719   Pneumonia due to infectious organism, unspecified laterality, unspecified part of lung   Puncture wound of right foot, initial encounter       ----    Independent Result Review and Interpretation: Relevant laboratory and radiographic results were reviewed and independently interpreted by myself.  As necessary, they are commented on in the ED Course.    Chronic conditions affecting the patient's care: As documented above in MDM    The patient was discussed with the following consultants/services: As described in MDM      Disposition   On signout to oncoming team, plan is to follow-up on lactate with appropriate disposition    Procedures   Procedures    Patient seen and discussed with ED attending physician.    Salvador Bravo MD  Emergency Medicine, PGY-2      Contreras Bravo MD  Resident  06/04/25 6139

## 2025-06-05 LAB
25(OH)D3 SERPL-MCNC: 31 NG/ML (ref 30–100)
ALBUMIN SERPL BCP-MCNC: 4.4 G/DL (ref 3.4–5)
ALP SERPL-CCNC: 67 U/L (ref 33–136)
ALT SERPL W P-5'-P-CCNC: 16 U/L (ref 10–52)
ANION GAP SERPL CALC-SCNC: 15 MMOL/L (ref 10–20)
AST SERPL W P-5'-P-CCNC: 20 U/L (ref 9–39)
BASOPHILS # BLD AUTO: 0.03 X10*3/UL (ref 0–0.1)
BASOPHILS NFR BLD AUTO: 0.6 %
BILIRUB SERPL-MCNC: 0.5 MG/DL (ref 0–1.2)
BUN SERPL-MCNC: 12 MG/DL (ref 6–23)
CALCIUM SERPL-MCNC: 9.2 MG/DL (ref 8.6–10.6)
CHLORIDE SERPL-SCNC: 102 MMOL/L (ref 98–107)
CHOLEST SERPL-MCNC: 139 MG/DL (ref 0–199)
CHOLESTEROL/HDL RATIO: 2.6
CO2 SERPL-SCNC: 26 MMOL/L (ref 21–32)
CREAT SERPL-MCNC: 0.83 MG/DL (ref 0.5–1.3)
EGFRCR SERPLBLD CKD-EPI 2021: >90 ML/MIN/1.73M*2
EOSINOPHIL # BLD AUTO: 0.15 X10*3/UL (ref 0–0.7)
EOSINOPHIL NFR BLD AUTO: 2.9 %
ERYTHROCYTE [DISTWIDTH] IN BLOOD BY AUTOMATED COUNT: 14.1 % (ref 11.5–14.5)
GLUCOSE BLD MANUAL STRIP-MCNC: 96 MG/DL (ref 74–99)
GLUCOSE SERPL-MCNC: 84 MG/DL (ref 74–99)
HCT VFR BLD AUTO: 41.6 % (ref 41–52)
HDLC SERPL-MCNC: 54.1 MG/DL
HGB BLD-MCNC: 14.2 G/DL (ref 13.5–17.5)
IMM GRANULOCYTES # BLD AUTO: 0.02 X10*3/UL (ref 0–0.7)
IMM GRANULOCYTES NFR BLD AUTO: 0.4 % (ref 0–0.9)
LDLC SERPL CALC-MCNC: 65 MG/DL
LYMPHOCYTES # BLD AUTO: 2.3 X10*3/UL (ref 1.2–4.8)
LYMPHOCYTES NFR BLD AUTO: 44 %
MCH RBC QN AUTO: 29.6 PG (ref 26–34)
MCHC RBC AUTO-ENTMCNC: 34.1 G/DL (ref 32–36)
MCV RBC AUTO: 87 FL (ref 80–100)
MONOCYTES # BLD AUTO: 0.67 X10*3/UL (ref 0.1–1)
MONOCYTES NFR BLD AUTO: 12.8 %
NEUTROPHILS # BLD AUTO: 2.06 X10*3/UL (ref 1.2–7.7)
NEUTROPHILS NFR BLD AUTO: 39.3 %
NON HDL CHOLESTEROL: 85 MG/DL (ref 0–149)
NRBC BLD-RTO: 0 /100 WBCS (ref 0–0)
PLATELET # BLD AUTO: 287 X10*3/UL (ref 150–450)
POTASSIUM SERPL-SCNC: 4.4 MMOL/L (ref 3.5–5.3)
PROT SERPL-MCNC: 7.4 G/DL (ref 6.4–8.2)
PSA SERPL-MCNC: 0.28 NG/ML
RBC # BLD AUTO: 4.8 X10*6/UL (ref 4.5–5.9)
SODIUM SERPL-SCNC: 139 MMOL/L (ref 136–145)
TRIGL SERPL-MCNC: 99 MG/DL (ref 0–149)
TSH SERPL-ACNC: 2.33 MIU/L (ref 0.44–3.98)
VLDL: 20 MG/DL (ref 0–40)
WBC # BLD AUTO: 5.2 X10*3/UL (ref 4.4–11.3)

## 2025-06-05 NOTE — ED PROCEDURE NOTE
Brief Attending Summary: 69-year-old male presenting for evaluation of right foot bleed found to be venous in nature that stopped after direct pressure.  Hemoglobin within an appropriate range, patient found to have hypoxia and is not having any ambulatory desaturations, has concern for pneumonia on x-ray, started on CAP treatment.    I have reviewed and evaluated the most recent data and results, personally examined the patient, and formulated the plan of care as presented above. This patient was critically ill and required continued critical care treatment. Teaching and any separately billable procedures are not included in the time calculation.    Billing Provider Critical Care Time: 25 minutes     Gavi Stapleton MD  06/05/25 0040

## 2025-06-27 ENCOUNTER — CLINICAL SUPPORT (OUTPATIENT)
Dept: AUDIOLOGY | Facility: CLINIC | Age: 70
End: 2025-06-27
Payer: MEDICARE

## 2025-06-27 ENCOUNTER — APPOINTMENT (OUTPATIENT)
Dept: OTOLARYNGOLOGY | Facility: CLINIC | Age: 70
End: 2025-06-27
Payer: MEDICARE

## 2025-06-27 VITALS — WEIGHT: 257 LBS | HEIGHT: 68 IN | BODY MASS INDEX: 38.95 KG/M2

## 2025-06-27 DIAGNOSIS — H93.8X3 SENSATION OF FULLNESS IN BOTH EARS: ICD-10-CM

## 2025-06-27 DIAGNOSIS — Z71.89 HEARING AID CONSULTATION: ICD-10-CM

## 2025-06-27 DIAGNOSIS — H61.23 BILATERAL IMPACTED CERUMEN: Primary | ICD-10-CM

## 2025-06-27 PROCEDURE — 99203 OFFICE O/P NEW LOW 30 MIN: CPT | Performed by: NURSE PRACTITIONER

## 2025-06-27 PROCEDURE — 3008F BODY MASS INDEX DOCD: CPT | Performed by: NURSE PRACTITIONER

## 2025-06-27 PROCEDURE — 1160F RVW MEDS BY RX/DR IN RCRD: CPT | Performed by: NURSE PRACTITIONER

## 2025-06-27 PROCEDURE — 1036F TOBACCO NON-USER: CPT | Performed by: NURSE PRACTITIONER

## 2025-06-27 PROCEDURE — 1159F MED LIST DOCD IN RCRD: CPT | Performed by: NURSE PRACTITIONER

## 2025-06-27 ASSESSMENT — PATIENT HEALTH QUESTIONNAIRE - PHQ9
1. LITTLE INTEREST OR PLEASURE IN DOING THINGS: NOT AT ALL
SUM OF ALL RESPONSES TO PHQ9 QUESTIONS 1 AND 2: 0
2. FEELING DOWN, DEPRESSED OR HOPELESS: NOT AT ALL

## 2025-06-27 NOTE — PROGRESS NOTES
Subjective   Patient ID: Alysia Hart is a 69 y.o. male who presents for New Patient Visit (Ear cleaning before audio.).  HPI  This patient is referred for evaluation of a sensation of clogged ears.  The patient is not accompanied by anyone.   When asked about ear pain, hearing loss, itching, discharge from ear, tinnitus, aural fullness or autophony, the patient admits to bilateral fullness due to cerumen impactions.  Patient reports longstanding history of cerumen impactions.  He is to have regular cleanings every 3 months through [facility].  He was scheduled today for a hearing aid consult but states he does not have concerns about his hearing and is not interested in having a hearing test.     When asked about a significant past otological history including history of prior ear surgery, noise exposure, exposure to ototoxic drugs or agents, and/or family history of hearing loss, the patient admits to none.    Review of Systems  A comprehensive or 10 points review of the patient's constitutional, neurological, HEENT, pulmonary, cardiovascular and genito-urinary systems showed only those mentioned in history of present illness.    Objective   Physical Exam  Constitutional: no fever, chills, weight loss or weight gain   General appearance: Appears well, well-nourished, well groomed. No acute distress.   Communication: Normal communication   Psychiatric: Oriented to person, place and time. Normal mood and affect.   Neurologic: Cranial nerves II-XII grossly intact and symmetric bilaterally.   Head and Face:   Head: Atraumatic with no masses, lesions or scarring.   Face: Normal symmetry, no paralysis, synkinesis or facial tic. No scars or deformities.     Eyes: Conjunctiva not edematous or erythematous   Ears: External inspection of ears with no deformity, scars or masses.  Bilateral canals with cerumen impaction     Neck: Normal appearing, symmetric, trachea midline.   Cardiovascular: Examination of peripheral vascular  system shows no clubbing or cyanosis.   Respiratory: No respiratory distress increased work of breathing. Inspection of the chest with symmetric chest expansion and normal respiratory effort.   Skin: No rashes in the head or neck    Assessment/Plan        This patient presents for initial evaluation of acute acquired bilateral cerumen impaction and bilateral aural fullness.    Reassurance given that otologic exam is normal after cleaning.  He may follow-up as needed.  All questions were answered to patient's satisfaction.    This note was created using speech recognition transcription software. Despite proofreading, several typographical errors might be present that might affect the meaning of the content. Please call with any questions.  Patient ID: Alysia Hart is a 69 y.o. male.    Ear cerumen removal    Date/Time: 6/27/2025 2:38 PM    Performed by: MIGUELINA Tim  Authorized by: MIGUELINA Tim    Consent:     Consent obtained:  Verbal    Consent given by:  Patient    Risks discussed:  Pain    Alternatives discussed:  No treatment  Procedure details:     Location:  L ear and R ear    Procedure type comment:  Suction    Procedure outcomes: cerumen removed    Post-procedure details:     Inspection:  No bleeding, ear canal clear and TM intact    Hearing quality:  Improved    Procedure completion:  Tolerated well, no immediate complications      MIGUELINA Tim 06/27/25 2:27 PM

## 2025-08-26 ENCOUNTER — PATIENT OUTREACH (OUTPATIENT)
Dept: CARE COORDINATION | Facility: CLINIC | Age: 70
End: 2025-08-26
Payer: MEDICARE

## 2025-10-01 ENCOUNTER — APPOINTMENT (OUTPATIENT)
Dept: OTOLARYNGOLOGY | Facility: CLINIC | Age: 70
End: 2025-10-01
Payer: MEDICARE

## 2025-10-15 ENCOUNTER — APPOINTMENT (OUTPATIENT)
Dept: PRIMARY CARE | Facility: CLINIC | Age: 70
End: 2025-10-15
Payer: MEDICARE